# Patient Record
Sex: FEMALE | Race: WHITE | Employment: FULL TIME | URBAN - NONMETROPOLITAN AREA
[De-identification: names, ages, dates, MRNs, and addresses within clinical notes are randomized per-mention and may not be internally consistent; named-entity substitution may affect disease eponyms.]

---

## 2018-03-10 ENCOUNTER — HOSPITAL ENCOUNTER (EMERGENCY)
Age: 53
Discharge: HOME | End: 2018-03-10
Attending: NURSE PRACTITIONER
Payer: COMMERCIAL

## 2018-03-10 VITALS
OXYGEN SATURATION: 95 % | DIASTOLIC BLOOD PRESSURE: 77 MMHG | RESPIRATION RATE: 18 BRPM | TEMPERATURE: 98.2 F | HEART RATE: 85 BPM | SYSTOLIC BLOOD PRESSURE: 118 MMHG

## 2018-03-10 DIAGNOSIS — J98.01 BRONCHOSPASM, ACUTE: ICD-10-CM

## 2018-03-10 DIAGNOSIS — J06.9 UPPER RESPIRATORY INFECTION WITH COUGH AND CONGESTION: Primary | ICD-10-CM

## 2018-03-10 PROCEDURE — 99202 OFFICE O/P NEW SF 15 MIN: CPT | Performed by: NURSE PRACTITIONER

## 2018-03-10 PROCEDURE — 99212 OFFICE O/P EST SF 10 MIN: CPT

## 2018-03-10 RX ORDER — PREDNISONE 10 MG/1
10 TABLET ORAL 2 TIMES DAILY
Qty: 14 TABLET | Refills: 0 | Status: SHIPPED | OUTPATIENT
Start: 2018-03-10 | End: 2018-03-17

## 2018-03-10 RX ORDER — LEVOFLOXACIN 500 MG/1
500 TABLET, FILM COATED ORAL DAILY
Qty: 10 TABLET | Refills: 0 | Status: SHIPPED | OUTPATIENT
Start: 2018-03-10 | End: 2018-03-20

## 2018-03-10 RX ORDER — ALBUTEROL SULFATE 90 UG/1
2 AEROSOL, METERED RESPIRATORY (INHALATION) EVERY 4 HOURS PRN
Qty: 1 INHALER | Refills: 0 | Status: SHIPPED | OUTPATIENT
Start: 2018-03-10 | End: 2021-11-03

## 2018-03-10 RX ORDER — BENZONATATE 100 MG/1
100 CAPSULE ORAL 3 TIMES DAILY PRN
Qty: 21 CAPSULE | Refills: 0 | Status: SHIPPED | OUTPATIENT
Start: 2018-03-10 | End: 2018-03-17

## 2018-03-10 ASSESSMENT — ENCOUNTER SYMPTOMS
NAUSEA: 0
SINUS PRESSURE: 1
COUGH: 1
VOMITING: 0
WHEEZING: 1
SINUS PAIN: 1
DIARRHEA: 0
SORE THROAT: 1
CONSTIPATION: 0
SHORTNESS OF BREATH: 0
CHEST TIGHTNESS: 1

## 2018-03-10 ASSESSMENT — PAIN SCALES - GENERAL: PAINLEVEL_OUTOF10: 2

## 2018-03-10 ASSESSMENT — PAIN DESCRIPTION - DESCRIPTORS: DESCRIPTORS: SORE

## 2018-03-10 ASSESSMENT — PAIN DESCRIPTION - PAIN TYPE: TYPE: ACUTE PAIN

## 2018-03-10 ASSESSMENT — PAIN DESCRIPTION - LOCATION: LOCATION: THROAT

## 2018-03-10 NOTE — ED NOTES
No change in patients condition. Discharge instructions and prescriptions discussed with pt. Pt. Verbalized understanding of info given and ambulated to exit in stable condition.       Lenora Romero RN  03/10/18 7480

## 2018-03-10 NOTE — ED PROVIDER NOTES
bee venom and amoxicillin. FAMILY HISTORY     Patient's family history includes Early Death in her father; Heart Disease in her father; Other in her brother and mother. SOCIAL HISTORY     Patient  reports that she has never smoked. She has never used smokeless tobacco. She reports that she drinks alcohol. She reports that she does not use drugs. PHYSICAL EXAM     ED TRIAGE VITALS  BP: 118/77, Temp: 98.2 °F (36.8 °C), Pulse: 85, Resp: 18, SpO2: 95 %  Physical Exam   Constitutional: She is oriented to person, place, and time. She appears well-developed and well-nourished. No distress. HENT:   Head: Normocephalic and atraumatic. Right Ear: External ear normal. A middle ear effusion is present. Left Ear: External ear normal. A middle ear effusion is present. Nose: Mucosal edema (erythema) present. Right sinus exhibits maxillary sinus tenderness. Left sinus exhibits maxillary sinus tenderness. Mouth/Throat: Uvula is midline and mucous membranes are normal. Oropharyngeal exudate, posterior oropharyngeal edema (injected) and posterior oropharyngeal erythema (minimal diffuse) present. Eyes: Conjunctivae are normal.   Neck: Neck supple. Cardiovascular: Normal rate, regular rhythm and normal heart sounds. Exam reveals no gallop and no friction rub. No murmur heard. Pulmonary/Chest: Effort normal. No respiratory distress. She has decreased breath sounds in the left middle field and the left lower field. She has wheezes in the left middle field and the left lower field. She has no rhonchi. She has no rales. Lymphadenopathy:     She has cervical adenopathy. Neurological: She is alert and oriented to person, place, and time. Skin: Skin is warm and dry. Psychiatric: She has a normal mood and affect. Her behavior is normal.   Nursing note and vitals reviewed. DIAGNOSTIC RESULTS   Labs:No results found for this visit on 03/10/18.     IMAGING:    URGENT CARE COURSE:     Vitals:    03/10/18 1235

## 2018-03-22 ENCOUNTER — HOSPITAL ENCOUNTER (OUTPATIENT)
Age: 53
Discharge: HOME | End: 2018-03-22
Payer: COMMERCIAL

## 2018-03-22 ENCOUNTER — HOSPITAL ENCOUNTER (OUTPATIENT)
Dept: GENERAL RADIOLOGY | Age: 53
Discharge: HOME | End: 2018-03-22
Payer: COMMERCIAL

## 2018-03-22 DIAGNOSIS — M54.2 CERVICAL PAIN: ICD-10-CM

## 2018-03-22 PROCEDURE — 72050 X-RAY EXAM NECK SPINE 4/5VWS: CPT

## 2019-04-09 ENCOUNTER — EMPLOYEE WELLNESS (OUTPATIENT)
Dept: OTHER | Age: 54
End: 2019-04-09

## 2019-04-09 LAB
CHOLESTEROL, TOTAL: 167 MG/DL (ref 0–199)
FASTING: YES
GLUCOSE BLD-MCNC: 92 MG/DL (ref 74–109)
HDLC SERPL-MCNC: 51 MG/DL (ref 40–90)
LDL CHOLESTEROL CALCULATED: 102 MG/DL
TRIGL SERPL-MCNC: 70 MG/DL (ref 0–199)

## 2019-04-15 VITALS — BODY MASS INDEX: 26.5 KG/M2 | WEIGHT: 190 LBS

## 2021-11-03 ENCOUNTER — OFFICE VISIT (OUTPATIENT)
Dept: ENT CLINIC | Age: 56
End: 2021-11-03
Payer: COMMERCIAL

## 2021-11-03 VITALS
RESPIRATION RATE: 14 BRPM | OXYGEN SATURATION: 98 % | SYSTOLIC BLOOD PRESSURE: 116 MMHG | TEMPERATURE: 97.3 F | DIASTOLIC BLOOD PRESSURE: 82 MMHG | HEART RATE: 86 BPM | HEIGHT: 71 IN | BODY MASS INDEX: 24.5 KG/M2 | WEIGHT: 175 LBS

## 2021-11-03 DIAGNOSIS — R05.3 CHRONIC COUGH: ICD-10-CM

## 2021-11-03 DIAGNOSIS — K01.1 IMPACTED NON-SUPERNUMERARY TOOTH: Primary | ICD-10-CM

## 2021-11-03 DIAGNOSIS — R09.82 PND (POST-NASAL DRIP): ICD-10-CM

## 2021-11-03 DIAGNOSIS — Z98.1 HISTORY OF FUSION OF CERVICAL SPINE: ICD-10-CM

## 2021-11-03 DIAGNOSIS — R49.9 CHANGE IN VOICE: ICD-10-CM

## 2021-11-03 PROCEDURE — 99203 OFFICE O/P NEW LOW 30 MIN: CPT | Performed by: PHYSICIAN ASSISTANT

## 2021-11-03 RX ORDER — FLUTICASONE PROPIONATE 50 MCG
1 SPRAY, SUSPENSION (ML) NASAL DAILY
Qty: 16 G | Refills: 0 | Status: SHIPPED | OUTPATIENT
Start: 2021-11-03 | End: 2022-01-24 | Stop reason: ALTCHOICE

## 2021-11-03 RX ORDER — CEFDINIR 300 MG/1
CAPSULE ORAL
COMMUNITY
Start: 2021-10-30 | End: 2022-01-24 | Stop reason: ALTCHOICE

## 2021-11-03 NOTE — PATIENT INSTRUCTIONS
nasal saline over the counter at pharmacy. [place] a couple sprays in each nostril 5-10 minutes before using your Flonase. The Flonase was prescribed for 1 spray to each nostril daily. You can increase to 1 spray twice daily if you have not had any improvement in 2 weeks of routine use.

## 2021-11-03 NOTE — PROGRESS NOTES
[address], NOSE AND THROAT  [facility]  Dept: [phone]  Dept Fax: [phone]  Loc: [phone]    Lizet Jones is a 60 y.o. female who was referred by [facility] JAYLEN Shabazz for:  Chief Complaint   Patient presents with    New Patient     Pt is here for maxillary pain and pressure   . HPI:     Patient presents for evaluation of sinus/dental problem. The patient saw her oral surgeon a couple of months ago and she was told that her right upper wisdom tooth is growing back. The oral surgeon reportedly completed a panorex and told the patient that the tooth is in the maxillary sinus and that there is some thickening and something encapsulated in the sinus that may be causing blockage. He recommended ENT evaluation before proceeding with extraction. The patient states that she had all wisdom teeth removed when she was 16. She denies any dental pain, facial pain, purulent/malodorous nasal drainage. She denies a history of recurrent sinus infections. The patient also reports 6 months of chronic, dry cough. She states it starts as a tickle in her throat typically. She can get coughing spells where she dry heaves afterward. 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 She states that the cough does not seem to worsen after she works. She is currently on Omnicef for upper respiratory infection and states that is helping it clear up. She does report some degree of environmental allergies. 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 Typically gets symptoms of clear rhinorrhea and she occasionally takes OTC antihistamine as needed. She denies any history of acid reflux symptoms. She avoids snacking within a few hours of going to bed as well. She has 1 cup of coffee per day, but otherwise avoids caffeine.   She does consume citrus fruits fairly routinely. She also reports minimal intake of chocolate, carbonated drinks, and alcohol. She denies any hemoptysis, dysphagia, and unintentional weight loss. She does also experience fairly frequent postnasal drainage reportedly. She states that she does feel like she has to strain to speak, but this has been ongoing since she had her anterior cervical fusion a few years ago. She denies any other symptoms or concerns at this time. Subjective:      REVIEW OF SYSTEMS:    A complete multi-organ review of systems was performed using a new patient questionnaire, and reviewed by me. ENT:  negative except as noted in HPI  CONSTITUTIONAL:  negative except as noted in HPI  EYES:  negative except as noted in HPI  RESPIRATORY:  negative except as noted in HPI  CARDIOVASCULAR:  negative except as noted in HPI  GASTROINTESTINAL:  negative except as noted in HPI  GENITOURINARY:  negative except as noted in HPI  MUSCULOSKELETAL:  negative except as noted in HPI  SKIN:  negative except as noted in HPI  ENDOCRINE/METABOLIC: negative except as noted in HPI  HEMATOLOGIC/LYMPHATIC:  negative except as noted in HPI  ALLERGY/IMMUN: negative except as noted in HPI  NEUROLOGICAL:  negative except as noted in HPI  BEHAVIOR/PSYCH:  negative except as noted in HPI    Past Medical History:  Past Medical History:   Diagnosis Date    Frequent PVCs        Social History:    TOBACCO:   reports that she has never smoked. She has never used smokeless tobacco.  ETOH:   reports current alcohol use. DRUGS:   reports no history of drug use. Family History:       Problem Relation Age of Onset    Heart Disease Father     Early Death Father     Other Mother         sudden death   [address] [facility] [address] Other Brother         WPW, Cardiomyopathy-ICD       Surgical History:  Past Surgical History:   Procedure Laterality Date    OTHER SURGICAL HISTORY  26 Nov 2013    C5-7 ACDF (Dr. Joseph Melgar, [facility])    TUBAL LIGATION          Objective:      This is a 60 y.o. female. Patient is alert and oriented to person, place and time. Patient appears well developed, well nourished. Mood is happy with normal affect. Not obviously hearing impaired. The patient is not obviously hoarse on exam at this time. /82 (Site: Right Upper Arm, Position: Sitting)   Pulse 86   Temp 97.3 °F (36.3 °C) (Infrared)   Resp 14   Ht 5' 11\" (1.803 m)   Wt 175 lb (79.4 kg)   SpO2 98%   BMI 24.41 kg/m²     Head:   Normocephalic, atraumatic. No obvious masses or lesions noted. Ears:  External ears: Normal: no scars, lesions or masses. Mastoid process: No erythema noted. No tenderness to palpation. R External auditory canal: clear and free of any pathology  L External auditory canal: clear and free of any pathology   Tympanic membranes:  R intact, translucent                                                  L intact, translucent     Nose:    External nose: Appears midline. No obvious deformity or masses. Septum:  deviated. No septal hematoma. No perforation. Mucosa:  clear  Turbinates: normal and pink            Discharge:  clear    Mouth/Throat:  Lips, tongue and oral cavity: Normal. No masses or lesions noted   Dentition: fair. There is a cavity where the right upper wisdom tooth is starting to erupt from. Nontender to palpation, no evidence of infection  Oral mucosa: moist  Tonsils: present, not acutely enlarged and no erythema or exudates  Oropharynx: normal-appearing mucosa  Hard and soft palates: symmetrical and intact. Salivary glands: not enlarged and no tenderness to palpation. Uvula: midline, no obvious lesions   Gag reflex is present. Neck: Trachea midline. Thyroid not enlarged, no palpable masses or tenderness. Lymphatic: No cervical lymphadenopathy noted. Eyes: VALERIA, EOM intact. Conjunctiva moist without discharge. Lungs: Normal effort of breathing, not obviously distressed. Neuro: Cranial nerves II-XII grossly intact.   Extremities: No clubbing, edema, or cyanosis noted. Assessment/Plan:     Diagnosis Orders   1. Impacted non-supernumerary tooth  CT FACIAL BONES WO CONTRAST   2. PND (post-nasal drip)  CT FACIAL BONES WO CONTRAST    fluticasone (FLONASE) 50 MCG/ACT nasal spray   3. Chronic cough  fluticasone (FLONASE) 50 MCG/ACT nasal spray   4. Change in voice     5. History of fusion of cervical spine         The patient is a 60 y.o. female that presents for evaluation of sinus problem and cough complaints. Patient was seen and plan created in conjunction with Dr. Gabino Bosch. We recommended proceeding with a CT facial bones to further assess the tooth and the reported abnormal findings on the Panorex. We explained to the patient that removal of the tooth will likely create an oroantral fistula, but these can be closed surgically if she were to develop one. However, we will proceed with a CT to further assess for other concerning findings. We also recommended a trial of Flonase to see if this will improve her postnasal drainage and subsequently improve the chronic cough. Patient will return in a few weeks for the results of the CT facial bone. At that time, we will discuss her response to [facility] and likely proceed with fiberoptic laryngoscopy to further assess sources of her cough and her increased work of speech. Patient expresses understanding of the plan and agree. She will contact the office in the meantime with new/worsening symptoms or other concerns.     (Please note that portions of this note may have been completed with a voice recognition program.  Efforts were made to edit the dictation but occasionally words are mis-transcribed.)    Electronically signed by ANT Dunn on 11/3/2021 at 2:47 PM

## 2021-11-05 ENCOUNTER — APPOINTMENT (OUTPATIENT)
Dept: CT IMAGING | Age: 56
End: 2021-11-05
Payer: COMMERCIAL

## 2021-11-05 ENCOUNTER — HOSPITAL ENCOUNTER (EMERGENCY)
Age: 56
Discharge: HOME | End: 2021-11-05
Attending: EMERGENCY MEDICINE
Payer: COMMERCIAL

## 2021-11-05 VITALS
HEART RATE: 103 BPM | TEMPERATURE: 98.7 F | SYSTOLIC BLOOD PRESSURE: 119 MMHG | OXYGEN SATURATION: 97 % | RESPIRATION RATE: 16 BRPM | DIASTOLIC BLOOD PRESSURE: 78 MMHG

## 2021-11-05 DIAGNOSIS — S39.012A STRAIN OF LUMBAR REGION, INITIAL ENCOUNTER: Primary | ICD-10-CM

## 2021-11-05 LAB
ANION GAP SERPL CALCULATED.3IONS-SCNC: 17 MEQ/L (ref 8–16)
BASOPHILS # BLD: 0.2 %
BASOPHILS ABSOLUTE: 0 THOU/MM3 (ref 0–0.1)
BILIRUBIN URINE: NEGATIVE
BLOOD, URINE: NEGATIVE
BUN BLDV-MCNC: 11 MG/DL (ref 7–22)
C-REACTIVE PROTEIN: 1.73 MG/DL (ref 0–1)
CALCIUM SERPL-MCNC: 9.2 MG/DL (ref 8.5–10.5)
CHARACTER, URINE: CLEAR
CHLORIDE BLD-SCNC: 100 MEQ/L (ref 98–111)
CO2: 20 MEQ/L (ref 23–33)
COLOR: YELLOW
CREAT SERPL-MCNC: 0.5 MG/DL (ref 0.4–1.2)
EOSINOPHIL # BLD: 0.1 %
EOSINOPHILS ABSOLUTE: 0 THOU/MM3 (ref 0–0.4)
ERYTHROCYTE [DISTWIDTH] IN BLOOD BY AUTOMATED COUNT: 13 % (ref 11.5–14.5)
ERYTHROCYTE [DISTWIDTH] IN BLOOD BY AUTOMATED COUNT: 45.1 FL (ref 35–45)
GFR SERPL CREATININE-BSD FRML MDRD: > 90 ML/MIN/1.73M2
GLUCOSE BLD-MCNC: 97 MG/DL (ref 70–108)
GLUCOSE URINE: NEGATIVE MG/DL
HCT VFR BLD CALC: 40.7 % (ref 37–47)
HEMOGLOBIN: 13.3 GM/DL (ref 12–16)
IMMATURE GRANS (ABS): 0.05 THOU/MM3 (ref 0–0.07)
IMMATURE GRANULOCYTES: 0.4 %
KETONES, URINE: >= 160
LEUKOCYTE ESTERASE, URINE: NEGATIVE
LYMPHOCYTES # BLD: 5.6 %
LYMPHOCYTES ABSOLUTE: 0.7 THOU/MM3 (ref 1–4.8)
MCH RBC QN AUTO: 30.9 PG (ref 26–33)
MCHC RBC AUTO-ENTMCNC: 32.7 GM/DL (ref 32.2–35.5)
MCV RBC AUTO: 94.4 FL (ref 81–99)
MONOCYTES # BLD: 5 %
MONOCYTES ABSOLUTE: 0.6 THOU/MM3 (ref 0.4–1.3)
NITRITE, URINE: NEGATIVE
NUCLEATED RED BLOOD CELLS: 0 /100 WBC
PH UA: 5 (ref 5–9)
PLATELET # BLD: 274 THOU/MM3 (ref 130–400)
PMV BLD AUTO: 10.8 FL (ref 9.4–12.4)
POTASSIUM REFLEX MAGNESIUM: 3.8 MEQ/L (ref 3.5–5.2)
PROTEIN UA: NEGATIVE
RBC # BLD: 4.31 MILL/MM3 (ref 4.2–5.4)
SEDIMENTATION RATE, ERYTHROCYTE: 16 MM/HR (ref 0–20)
SEG NEUTROPHILS: 88.7 %
SEGMENTED NEUTROPHILS ABSOLUTE COUNT: 11.1 THOU/MM3 (ref 1.8–7.7)
SODIUM BLD-SCNC: 137 MEQ/L (ref 135–145)
SPECIFIC GRAVITY, URINE: 1.01 (ref 1–1.03)
UROBILINOGEN, URINE: 0.2 EU/DL (ref 0–1)
WBC # BLD: 12.5 THOU/MM3 (ref 4.8–10.8)

## 2021-11-05 PROCEDURE — 2580000003 HC RX 258

## 2021-11-05 PROCEDURE — 96361 HYDRATE IV INFUSION ADD-ON: CPT

## 2021-11-05 PROCEDURE — 76376 3D RENDER W/INTRP POSTPROCES: CPT

## 2021-11-05 PROCEDURE — 81003 URINALYSIS AUTO W/O SCOPE: CPT

## 2021-11-05 PROCEDURE — 86140 C-REACTIVE PROTEIN: CPT

## 2021-11-05 PROCEDURE — 85025 COMPLETE CBC W/AUTO DIFF WBC: CPT

## 2021-11-05 PROCEDURE — 80048 BASIC METABOLIC PNL TOTAL CA: CPT

## 2021-11-05 PROCEDURE — 36415 COLL VENOUS BLD VENIPUNCTURE: CPT

## 2021-11-05 PROCEDURE — 99282 EMERGENCY DEPT VISIT SF MDM: CPT

## 2021-11-05 PROCEDURE — 96374 THER/PROPH/DIAG INJ IV PUSH: CPT

## 2021-11-05 PROCEDURE — 74176 CT ABD & PELVIS W/O CONTRAST: CPT

## 2021-11-05 PROCEDURE — 85651 RBC SED RATE NONAUTOMATED: CPT

## 2021-11-05 PROCEDURE — 6360000002 HC RX W HCPCS

## 2021-11-05 RX ORDER — SODIUM CHLORIDE, SODIUM LACTATE, POTASSIUM CHLORIDE, AND CALCIUM CHLORIDE .6; .31; .03; .02 G/100ML; G/100ML; G/100ML; G/100ML
1000 INJECTION, SOLUTION INTRAVENOUS ONCE
Status: COMPLETED | OUTPATIENT
Start: 2021-11-05 | End: 2021-11-05

## 2021-11-05 RX ORDER — KETOROLAC TROMETHAMINE 30 MG/ML
15 INJECTION, SOLUTION INTRAMUSCULAR; INTRAVENOUS EVERY 6 HOURS PRN
Status: DISCONTINUED | OUTPATIENT
Start: 2021-11-05 | End: 2021-11-05 | Stop reason: HOSPADM

## 2021-11-05 RX ORDER — KETOROLAC TROMETHAMINE 30 MG/ML
30 INJECTION, SOLUTION INTRAMUSCULAR; INTRAVENOUS EVERY 6 HOURS PRN
Status: DISCONTINUED | OUTPATIENT
Start: 2021-11-05 | End: 2021-11-05

## 2021-11-05 ASSESSMENT — PAIN SCALES - GENERAL
PAINLEVEL_OUTOF10: 6
PAINLEVEL_OUTOF10: 6

## 2021-11-05 NOTE — ED NOTES
Pt presents with severe low back pain radiating down both legs starting this morning. She reports that she took a oxycodone from  and this has helped with pain. She reports some recent constipation but now she is having incontinence of stool. Pt is alert and oriented.       Desiree Quan RN  11/05/21 0520

## 2021-11-05 NOTE — ED PROVIDER NOTES
I personally saw and examined the patient. I have reviewed and agreed with the resident physician's findings including all diagnostic interpretations and treatment plans as written. Please see resident physician's chart for detailed history of present illness, physical exam and medical decision making. I was present for the key portions of any procedures performed and the inclusive time noted in any critical care statement. This is a 64-year-old female with past medical history of frequent PVC presents to ED complaining of acute lower back pain since this morning. New pain. She denies back injury. Pain was sudden onset, shooting down to both feet. Pain is at moderate intensity. Patient had some chills last night but no fever. Physical exam: Afebrile, vital signs stable, cardiopulmonary exam unremarkable, abdomen soft. Tenderness to L4-L5 midline, intact bilateral lower extremity neurovascular exam.    Differential diagnosis: Low back strain, osteopenia, stress fracture, ruling out catastrophic spine pathology such as discitis or osteomyelitis, UTI, nephrolithiasis, lumbar radiculopathy, acute lumbar disc herniation, spinal stenosis    Plan: We will obtain labs and CT abdomen pelvis with lumbar reconstructive, IV Toradol for pain control. Pain is better on reassessment. Lab results are reassuring. CT abdomen pelvis with lumbar recon does not show acute abnormality. Clinically this is musculoskeletal strain. Patient's UA shows ketones moderate 160, patient is taking keto diet. Patient discharged with PCP follow-up. OTC Tylenol or ibuprofen as needed for pain. Medications   ketorolac (TORADOL) injection 15 mg (15 mg IntraVENous Given 11/5/21 1500)   lactated ringers bolus (0 mLs IntraVENous Stopped 11/5/21 1631)     DIAGNOSIS  1.  Strain of lumbar region, initial encounter        DISPOSITION and John Abel M.D.         Demi Alves MD  11/05/21 9973

## 2021-11-05 NOTE — ED PROVIDER NOTES
[address] [address]          Pt Name: Main Cast  MRN: [id number]  [place] 1965  Date of evaluation: 11/5/2021  Treating Resident Physician: Isis Soto MD  Supervising Physician: Dr. Lorie Grider MD    CHIEF COMPLAINT     No chief complaint on file. History obtained from the patient. HISTORY OF PRESENT ILLNESS    HPI  Main Cast is a 60 y.o. female who presents to the emergency department for evaluation of new onset lower back pain starting this morning. Patient reports that her pain radiates over the lateral aspect of both of her legs all the way down to her feet. She has a history of chronic left-sided neck pain and left arm pain and only takes Flonase and multivitamins. Patient denies any inciting trauma or previous injury to this site. She locates her pain over the L4-L5 area directly midline. Patient states during onset of pain, she was sitting down on a chair and could not get comfortable. Patient reports that she is chronically constipated and states mild fecal incontinence this morning however subsequently retracted her statement. Denies associated numbness or tingling down her lower extremities. Does endorse mild chills last night but no fever. Denies fevers, nausea, headaches, dizziness, chest pain, abdominal pain, dysuria, hematuria, flank pain, suprapubic tenderness, melena, hematochezia, dyspnea, or shortness of breath. The patient has no other acute complaints at this time. REVIEW OF SYSTEMS   Review of Systems -negative except for the aforementioned.       PAST MEDICAL AND SURGICAL HISTORY     Past Medical History:   Diagnosis Date    Frequent PVCs      Past Surgical History:   Procedure Laterality Date    OTHER SURGICAL HISTORY  26 Nov 2013    C5-7 ACDF (Dr. Tiffanie Beatty, [facility])    TUBAL LIGATION           MEDICATIONS     Current Facility-Administered Medications:     ketorolac (TORADOL) injection 15 mg, 15 mg, IntraVENous, Q6H PRN, Tip Amin MD, 15 mg at 11/05/21 1500    lactated ringers bolus, 1,000 mL, IntraVENous, Once, Tip Amin MD    Current Outpatient Medications:     cefdinir (OMNICEF) 300 MG capsule, take 1 capsule by mouth twice a day for 10 days, Disp: , Rfl:     fluticasone (FLONASE) 50 MCG/ACT nasal spray, 1 spray by Each Nostril route daily, Disp: 16 g, Rfl: 0    Multiple Vitamins-Minerals (THERAPEUTIC MULTIVITAMIN-MINERALS) tablet, Take 1 tablet by mouth daily. , Disp: , Rfl:       SOCIAL HISTORY     Social History     Social History Narrative    Not on file     Social History     Tobacco Use    Smoking status: Never Smoker    Smokeless tobacco: Never Used   Substance Use Topics    Alcohol use: Yes     Comment: occas    Drug use: No         ALLERGIES     Allergies   Allergen Reactions    Bee Venom Swelling    Amoxicillin Swelling         FAMILY HISTORY     Family History   Problem Relation Age of Onset    Heart Disease Father     Early Death Father     Other Mother         sudden death   Bev Chinchilla Other Brother         WPW, Cardiomyopathy-ICD         PREVIOUS RECORDS   Previous records reviewed: Medications, labs, imaging, and notes. PHYSICAL EXAM     ED Triage Vitals   BP Temp Temp Source Pulse Resp SpO2 Height Weight   11/05/21 1249 11/05/21 1249 11/05/21 1249 11/05/21 1248 11/05/21 1248 11/05/21 1248 -- --   119/78 98.7 °F (37.1 °C) Oral 103 16 97 %       Initial vital signs and nursing assessment reviewed and Stable. There is no height or weight on file to calculate BMI. Pulsoximetry is 97% on room air with adequate waveform per my interpretation. Additional Vital Signs:  Vitals:    11/05/21 1249   BP: 119/78   Pulse:    Resp:    Temp: 98.7 °F (37.1 °C)   SpO2:        Physical Exam  Vitals and nursing note reviewed. Constitutional:       General: She is awake. Appearance: Normal appearance. She is not ill-appearing or diaphoretic.    HENT:      Head: Normocephalic and atraumatic. Right Ear: External ear normal.      Left Ear: External ear normal.      Nose: Nose normal.      Mouth/Throat:      Mouth: Mucous membranes are moist.      Pharynx: Oropharynx is clear. No oropharyngeal exudate or posterior oropharyngeal erythema. Eyes:      General: Lids are normal. No scleral icterus. Extraocular Movements: Extraocular movements intact. Pupils: Pupils are equal, round, and reactive to light. Cardiovascular:      Rate and Rhythm: Normal rate and regular rhythm. Pulses: Normal pulses. Radial pulses are 2+ on the right side and 2+ on the left side. Posterior tibial pulses are 2+ on the right side and 2+ on the left side. Heart sounds: Normal heart sounds. No murmur heard. No friction rub. No gallop. Pulmonary:      Effort: Pulmonary effort is normal.      Breath sounds: Normal breath sounds. No wheezing, rhonchi or rales. Abdominal:      General: Bowel sounds are normal.      Palpations: Abdomen is soft. Tenderness: There is no abdominal tenderness. There is no guarding or rebound. Musculoskeletal:      Cervical back: Normal range of motion and neck supple. Lumbar back: Tenderness (L4-L5) present. No swelling, deformity or lacerations. Right lower leg: No edema. Left lower leg: No edema. Skin:     General: Skin is warm and dry. Neurological:      General: No focal deficit present. Mental Status: She is alert and oriented to person, place, and time. Mental status is at baseline. GCS: GCS eye subscore is 4. GCS verbal subscore is 5. GCS motor subscore is 6. Psychiatric:         Attention and Perception: Attention and perception normal.         Mood and Affect: Mood and affect normal.         Speech: Speech normal.         Behavior: Behavior normal. Behavior is cooperative. Thought Content:  Thought content normal.         Cognition and Memory: Cognition and memory normal.         Judgment: Judgment normal.         MEDICAL DECISION MAKING   Initial Assessment:   Lower back pain -given subjective chills, and sudden onset back pain, cannot rule out infectious etiologies such as osteomyelitis. Given age, gender, and reproducible tenderness on palpation of her mid lumbar L4-L5 spine, cannot exclude acute lumbar compression fracture. Also on the differential is bilateral IT band syndrome, sciatica, nephrolithiasis, spinal stenosis, discitis, and UTI. Plan:    CT abdomen pelvis without contrast and with lumbar reconstruction   CBC   BMP   ESR   CRP   UA with microscopy and culture   Toradol for pain. ED RESULTS   Laboratory results:  Labs Reviewed   CBC WITH AUTO DIFFERENTIAL - Abnormal; Notable for the following components:       Result Value    WBC 12.5 (*)     RDW-SD 45.1 (*)     Segs Absolute 11.1 (*)     Lymphocytes Absolute 0.7 (*)     All other components within normal limits   BASIC METABOLIC PANEL W/ REFLEX TO MG FOR LOW K - Abnormal; Notable for the following components:    CO2 20 (*)     All other components within normal limits   C-REACTIVE PROTEIN - Abnormal; Notable for the following components:    CRP 1.73 (*)     All other components within normal limits   URINE RT REFLEX TO CULTURE - Abnormal; Notable for the following components:    Ketones, Urine >= 160 (*)     All other components within normal limits   ANION GAP - Abnormal; Notable for the following components:    Anion Gap 17.0 (*)     All other components within normal limits   GLOMERULAR FILTRATION RATE, ESTIMATED   SEDIMENTATION RATE       Radiologic studies results:  CT ABDOMEN PELVIS WO CONTRAST Additional Contrast? None   Final Result       1. Possible bilateral renal pelves. No evidence of renal stones. .   2. Small hiatal hernia. 3. Atherosclerotic calcification in the abdominal aorta and iliac arteries. 4. Degenerative changes involving the lumbar spine and sacroiliac joints bilaterally.    5. Tiny calcified granuloma at the left lung base. 6. Left breast implant. **This report has been created using voice recognition software. It may contain minor errors which are inherent in voice recognition technology. **      Final report electronically signed by DR Laura Amador on 11/5/2021 2:59 PM      CT LUMBAR RECONSTRUCTION WO POST PROCESS   Final Result    Mild discovertebral degenerative changes of the lumbar spine. **This report has been created using voice recognition software. It may contain minor errors which are inherent in voice recognition technology. **      Final report electronically signed by Dr. Arsenio Babcock MD on 11/5/2021 2:57 PM          ED Medications administered this visit:   Medications   ketorolac (TORADOL) injection 15 mg (15 mg IntraVENous Given 11/5/21 1500)   lactated ringers bolus (has no administration in time range)         ED COURSE     ED Course as of Nov 05 1518   Fri Nov 05, 2021   1454 CT ABDOMEN PELVIS WO CONTRAST Additional Contrast? None [KENRICK]   1229 CTAP + lumbar reconstruction without concern for infectious etiology. Mild degenerative changes throughout vertebra. CBC, BMP, CRP, and UA wnl. At this time, pt likely has muscle strain. Recommend rest, avoiding heavy lifting, and heating pad. [KENRICK]      ED Course User Index  [KENRICK] Leopoldo Gates MD     Strict return precautions and follow up instructions were discussed with the patient prior to discharge, with which the patient agrees. MEDICATION CHANGES     New Prescriptions    No medications on file         FINAL DISPOSITION     Final diagnoses:   Strain of lumbar region, initial encounter     Condition: condition: fair  Dispo: Discharge to home    This transcription was electronically signed. Parts of this transcriptions may have been dictated by use of voice recognition software and electronically transcribed, and parts may have been transcribed with the assistance of an ED scribe.  The transcription may contain errors not detected in proofreading. Please refer to my supervising physician's documentation if my documentation differs.     Electronically Signed: Nicolas Hutson MD, 11/05/21, 3:18 PM         Perla Castillo MD  Resident  11/05/21 9450

## 2021-12-17 ENCOUNTER — HOSPITAL ENCOUNTER (OUTPATIENT)
Dept: CT IMAGING | Age: 56
Discharge: HOME | End: 2021-12-17
Payer: COMMERCIAL

## 2021-12-17 DIAGNOSIS — R09.82 PND (POST-NASAL DRIP): ICD-10-CM

## 2021-12-17 DIAGNOSIS — K01.1 IMPACTED NON-SUPERNUMERARY TOOTH: ICD-10-CM

## 2021-12-17 PROCEDURE — 70486 CT MAXILLOFACIAL W/O DYE: CPT

## 2022-01-24 ENCOUNTER — OFFICE VISIT (OUTPATIENT)
Dept: ENT CLINIC | Age: 57
End: 2022-01-24
Payer: COMMERCIAL

## 2022-01-24 VITALS
DIASTOLIC BLOOD PRESSURE: 82 MMHG | OXYGEN SATURATION: 99 % | RESPIRATION RATE: 14 BRPM | HEIGHT: 71 IN | WEIGHT: 179.9 LBS | TEMPERATURE: 97.3 F | SYSTOLIC BLOOD PRESSURE: 132 MMHG | HEART RATE: 79 BPM | BODY MASS INDEX: 25.19 KG/M2

## 2022-01-24 DIAGNOSIS — Z86.19 HISTORY OF CLOSTRIDIOIDES DIFFICILE COLITIS: ICD-10-CM

## 2022-01-24 DIAGNOSIS — J32.1 CHRONIC FRONTAL SINUSITIS: ICD-10-CM

## 2022-01-24 DIAGNOSIS — J32.2 CHRONIC ETHMOIDAL SINUSITIS: ICD-10-CM

## 2022-01-24 DIAGNOSIS — J32.0 CHRONIC MAXILLARY SINUSITIS: Primary | ICD-10-CM

## 2022-01-24 DIAGNOSIS — J34.89 CONCHA BULLOSA: ICD-10-CM

## 2022-01-24 PROCEDURE — 99214 OFFICE O/P EST MOD 30 MIN: CPT | Performed by: NURSE PRACTITIONER

## 2022-01-24 RX ORDER — FLUTICASONE PROPIONATE 50 MCG
1 SPRAY, SUSPENSION (ML) NASAL 2 TIMES DAILY
Qty: 16 G | Refills: 2 | Status: SHIPPED | OUTPATIENT
Start: 2022-01-24 | End: 2022-10-13

## 2022-01-24 NOTE — PROGRESS NOTES
[facility] [place] [facility]  [facility]  [facility] [place]pt: [phone]  Dept Fax: [phone]  Loc: Heydi Mauricio is a 60 y.o. female who was referred by No ref. provider found for:  Chief Complaint   Patient presents with    Follow-up     Patient is here for a follow up CT scan      HPI:     Charlene Winslow is a 60 y.o. female here for follow up to review CT sinus findings. She has been on Flonase since prior to CT scan. At the time of her CT scan she was still symptomatic, but her cough and PND have since resolved. She denies any other sinonasal symptoms at this time. Initial HPI 11/3/2021:  Patient presents for evaluation of sinus/dental problem. The patient saw her oral surgeon a couple of months ago and she was told that her right upper wisdom tooth is growing back. The oral surgeon reportedly completed a panorex and told the patient that the tooth is in the maxillary sinus and that there is some thickening and something encapsulated in the sinus that may be causing blockage. He recommended ENT evaluation before proceeding with extraction. The patient states that she had all wisdom teeth removed when she was 16. She denies any dental pain, facial pain, purulent/malodorous nasal drainage. She denies a history of recurrent sinus infections.      The patient also reports 6 months of chronic, dry cough. She states it starts as a tickle in her throat typically. She can get coughing spells where she dry heaves afterward. 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 She states that the cough does not seem to worsen after she works. She is currently on Omnicef for upper respiratory infection and states that is helping it clear up. She does report some degree of environmental allergies. 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 Typically gets symptoms of clear rhinorrhea and she occasionally takes OTC antihistamine as needed. She denies any history of acid reflux symptoms. She avoids snacking within a few hours of going to bed as well. She has 1 cup of coffee per day, but otherwise avoids caffeine. She does consume citrus fruits fairly routinely. She also reports minimal intake of chocolate, carbonated drinks, and alcohol. She denies any hemoptysis, dysphagia, and unintentional weight loss. She does also experience fairly frequent postnasal drainage reportedly. She states that she does feel like she has to strain to speak, but this has been ongoing since she had her anterior cervical fusion a few years ago. She denies any other symptoms or concerns at this time. History: Allergies   Allergen Reactions    Bee Venom Swelling    Amoxicillin Swelling     Current Outpatient Medications   Medication Sig Dispense Refill    fluticasone (FLONASE) 50 MCG/ACT nasal spray 1 spray by Each Nostril route 2 times daily 16 g 2    Multiple Vitamins-Minerals (THERAPEUTIC MULTIVITAMIN-MINERALS) tablet Take 1 tablet by mouth daily. No current facility-administered medications for this visit. Past Medical History:   Diagnosis Date    Frequent PVCs       Past Surgical History:   Procedure Laterality Date    OTHER SURGICAL HISTORY  26 Nov 2013    C5-7 ACDF (Dr. Gely Zuniga, [facility])    TUBAL LIGATION       Family History   Problem Relation Age of Onset    Heart Disease Father     Early Death Father     Other Mother         sudden death   Wilhelminia Blazing Other Brother         WPW, Cardiomyopathy-ICD     Social History     Tobacco Use    Smoking status: Never Smoker    Smokeless tobacco: Never Used   Substance Use Topics    Alcohol use: Yes     Comment: occas        Subjective:      Review of Systems  Rest of review of systems are negative, except as noted in HPI.      Objective:     /82 (Site: Left Upper Arm, Position: Sitting)   Pulse 79 Temp 97.3 °F (36.3 °C) (Infrared)   Resp 14   Ht 5' 11\" (1.803 m)   Wt 179 lb 14.4 oz (81.6 kg)   SpO2 99%   BMI 25.09 kg/m²     PHYSICAL EXAM  Constitutional: Oriented to person, place, and time. Appears stated aged. Appears well-developed and well-nourished. Voice and speech pattern appropriate for age and gender. No distress. No stridor or audible wheezing. Vitals reviewed. Data:  All of the past medical history, past surgical history, family history,social history, allergies and current medications were reviewed with the patient. CT Facial Bones WO Contrast 12/17/2021  Narrative   PROCEDURE: CT FACIAL BONES WO CONTRAST       CLINICAL INFORMATION: Impacted non-supernumerary tooth, PND (post-nasal drip).       COMPARISON: No prior study.       TECHNIQUE: Helical CT of the sinuses with sagittal and coronal reconstructions.       All CT scans at this facility use dose modulation, iterative reconstruction, and/or weight-based dosing when appropriate to reduce radiation dose to as low as reasonably achievable.       FINDINGS:   Whit [place]ander is moderate mucosal thickening in the left maxillary sinus with occlusion of the ostiomeatal unit. There is an ectopic tooth with the crown embedded within the floor of the right maxillary sinus. The roots of the teeth extend into the antrum. There is mild mucosal thickening in the right maxillary sinus. The ostiomeatal unit is clear. There is mild mucosal thickening in the frontal sinuses with occlusion of the frontal ethmoidal recesses. There is moderate mucosal thickening in the ethmoid    air cells. Sphenoid sinuses and sphenoethmoidal recesses are clear. There is mild leftward deviation of the nasal septum.       Orbits are unremarkable. Paranasal sinuses and mastoid air cells are clear. Visualized intracranial contents are unremarkable. Temporal mandibular joints appear intact.       Impression       1. Ectopic tooth within the right maxillary sinus.    2. Mild to moderate sinusitis.       **This report has been created using voice recognition software. It may contain minor errors which are inherent in voice recognition technology. **       Final report electronically signed by Dr. Nancie Torres MD on 12/17/2021 12:01 PM                      Assessment & Plan   Diagnoses and all orders for this visit:     Diagnosis Orders   1. Chronic maxillary sinusitis  CT FACIAL BONES WO CONTRAST   2. Chronic frontal sinusitis  CT FACIAL BONES WO CONTRAST   3. Chronic ethmoidal sinusitis  CT FACIAL BONES WO CONTRAST   4. Dante bullosa (left middle turbinate)  CT FACIAL BONES WO CONTRAST   5. History of Clostridioides difficile colitis         The findings were explained and her questions were answered. CT imaging reviewed in detail with patient. Regarding her initial referral reason for impacted right maxillary tooth, I would highly advise against removal as long as she remains asymptomatic from that standpoint. The tooth takes up space along the entire right maxillary floor, so it's removal will likely require bone grafting otherwise she will have large oroantral fistula after removal.  Patient has mucosal thickening evident of chronic sinus disease in both ethmoid sinuses, both maxillary sinuses and at the inlet of both frontal sinuses. The left middle turbinate dante bullosa causing blockage of the left osteomeatal complex and likely is causing the right maxillary sinus not to drain properly. There is a similar process on the left side, although no dante bullosa present. The Flonase spray is keeping her symptoms at [place] [id number] for now, but due to these findings she will be highly susceptible to worsening sinus disease with recurrent infections. Typically would give course of broad-spectrum antibiotics, up to 4 weeks duration, but patient does not tolerate antibiotics from a GI standpoint secondary to more recent history of C-diff colitis.   Will treat with BID saline irrigations followed by Flonase spray. In about 2 months from now, we will repeat CT sinus to determine if topical therapy is enough to resolve her sinus disease. Patient verbalizes understanding of plan of care. Management of patient's care was collaborated with Dr Ludivina Moreno today. Return for results review. **This report has been created using voice recognition software. It may contain minor errors which are inherent in voice recognition technology. **

## 2022-03-07 ENCOUNTER — HOSPITAL ENCOUNTER (OUTPATIENT)
Dept: CT IMAGING | Age: 57
Discharge: HOME | End: 2022-03-07
Payer: COMMERCIAL

## 2022-03-07 ENCOUNTER — OFFICE VISIT (OUTPATIENT)
Dept: ENT CLINIC | Age: 57
End: 2022-03-07
Payer: COMMERCIAL

## 2022-03-07 ENCOUNTER — PREP FOR PROCEDURE (OUTPATIENT)
Dept: ENT CLINIC | Age: 57
End: 2022-03-07

## 2022-03-07 VITALS
HEIGHT: 71 IN | BODY MASS INDEX: 25.48 KG/M2 | WEIGHT: 182 LBS | TEMPERATURE: 98.1 F | RESPIRATION RATE: 14 BRPM | SYSTOLIC BLOOD PRESSURE: 110 MMHG | DIASTOLIC BLOOD PRESSURE: 70 MMHG | OXYGEN SATURATION: 94 % | HEART RATE: 85 BPM

## 2022-03-07 DIAGNOSIS — J34.89 CONCHA BULLOSA: ICD-10-CM

## 2022-03-07 DIAGNOSIS — J32.0 CHRONIC MAXILLARY SINUSITIS: Primary | ICD-10-CM

## 2022-03-07 DIAGNOSIS — J32.0 CHRONIC MAXILLARY SINUSITIS: ICD-10-CM

## 2022-03-07 DIAGNOSIS — R09.82 PND (POST-NASAL DRIP): ICD-10-CM

## 2022-03-07 DIAGNOSIS — J32.1 CHRONIC FRONTAL SINUSITIS: ICD-10-CM

## 2022-03-07 DIAGNOSIS — J32.2 CHRONIC ETHMOIDAL SINUSITIS: ICD-10-CM

## 2022-03-07 DIAGNOSIS — J34.3 NASAL TURBINATE HYPERTROPHY: ICD-10-CM

## 2022-03-07 DIAGNOSIS — K01.1 IMPACTED NON-SUPERNUMERARY TOOTH: ICD-10-CM

## 2022-03-07 DIAGNOSIS — Z98.1 HISTORY OF FUSION OF CERVICAL SPINE: ICD-10-CM

## 2022-03-07 DIAGNOSIS — R49.9 CHANGE IN VOICE: ICD-10-CM

## 2022-03-07 DIAGNOSIS — Z01.818 PRE-OP TESTING: Primary | ICD-10-CM

## 2022-03-07 DIAGNOSIS — R05.3 CHRONIC COUGH: ICD-10-CM

## 2022-03-07 PROCEDURE — 70486 CT MAXILLOFACIAL W/O DYE: CPT

## 2022-03-07 PROCEDURE — 99214 OFFICE O/P EST MOD 30 MIN: CPT | Performed by: PHYSICIAN ASSISTANT

## 2022-03-07 NOTE — PROGRESS NOTES
[facility] [place] [facility]  [facility]  [facility] [place]  Dept: [phone]  Dept Fax: [phone]  Loc: Leland Carter is a 60 y.o. female who was referred by No ref. provider found for:  Chief Complaint   Patient presents with    Follow-up     Patient is here for a follow up after CT scan    . HPI:     Current visit HPI- The patient presents for follow up to review CT results and further discussions of symptoms. She reports she does feel congested intermittently. She has frequent nasal drainage still that seems to cause her to cough. She states that her previous cough was dry, but recently has been more of a wet cough. She denies any chest pain, shortness of breath, fevers, chills. She reports some recent wheezing due to the drainage. She denies any other symptoms or concerns at this time. Previous ENT HPI 1/24/22- Deann Kang is a 60 y.o. female here for follow up to review CT sinus findings. She has been on Flonase since prior to CT scan. At the time of her CT scan she was still symptomatic, but her cough and PND have since resolved. She denies any other sinonasal symptoms at this time. Initial ENT HPI 11/3/21- Patient presents for evaluation of sinus/dental problem. The patient saw her oral surgeon a couple of months ago and she was told that her right upper wisdom tooth is growing back. The oral surgeon reportedly completed a panorex and told the patient that the tooth is in the maxillary sinus and that there is some thickening and something encapsulated in the sinus that may be causing blockage. He recommended ENT evaluation before proceeding with extraction. The patient states that she had all wisdom teeth removed when she was 16. She denies any dental pain, facial pain, purulent/malodorous nasal drainage.  She denies a history of recurrent sinus infections.      The patient also reports 6 months of chronic, dry cough. She states it starts as a tickle in her throat typically. She can get coughing spells where she dry heaves afterward. 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 She states that the cough does not seem to worsen after she works. She is currently on Omnicef for upper respiratory infection and states that is helping it clear up. She does report some degree of environmental allergies. 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 Typically gets symptoms of clear rhinorrhea and she occasionally takes OTC antihistamine as needed. She denies any history of acid reflux symptoms. She avoids snacking within a few hours of going to bed as well. She has 1 cup of coffee per day, but otherwise avoids caffeine. She does consume citrus fruits fairly routinely. She also reports minimal intake of chocolate, carbonated drinks, and alcohol. She denies any hemoptysis, dysphagia, and unintentional weight loss. She does also experience fairly frequent postnasal drainage reportedly. She states that she does feel like she has to strain to speak, but this has been ongoing since she had her anterior cervical fusion a few years ago. She denies any other symptoms or concerns at this time. Subjective:      REVIEW OF SYSTEMS:    A complete multi-organ review of systems was performed using a new patient questionnaire, and reviewed by me.   ENT:  negative except as noted in HPI  CONSTITUTIONAL:  negative except as noted in HPI  EYES:  negative except as noted in HPI  RESPIRATORY:  negative except as noted in HPI  CARDIOVASCULAR:  negative except as noted in HPI  GASTROINTESTINAL:  negative except as noted in HPI  GENITOURINARY:  negative except as noted in HPI  MUSCULOSKELETAL:  negative except as noted in HPI  SKIN:  negative except as noted in HPI  ENDOCRINE/METABOLIC: negative except as noted in HPI  HEMATOLOGIC/LYMPHATIC: negative except as noted in HPI  ALLERGY/IMMUN: negative except as noted in HPI  NEUROLOGICAL:  negative except as noted in HPI  BEHAVIOR/PSYCH:  negative except as noted in HPI    Past Medical History:  Past Medical History:   Diagnosis Date    Frequent PVCs      Social History:    TOBACCO:   reports that she has never smoked. She has never used smokeless tobacco.  ETOH:   reports current alcohol use. DRUGS:   reports no history of drug use. Family History:       Problem Relation Age of Onset    Heart Disease Father     Early Death Father     Other Mother         sudden death   [place] Other Brother         WPW, Cardiomyopathy-ICD       Surgical History:  Past Surgical History:   Procedure Laterality Date    OTHER SURGICAL HISTORY  26 Nov 2013    C5-7 ACDF (Dr. Pravin Grimaldo, [facility])    TUBAL LIGATION          Objective: This is a 60 y.o. female. Patient is alert and oriented to person, place and time. Patient appears well developed, well nourished. Mood is happy with normal affect. Not obviously hearing impaired. No abnormality in speech noted. /70 (Site: Left Upper Arm, Position: Sitting)   Pulse 85   Temp 98.1 °F (36.7 °C) (Infrared)   Resp 14   Ht 5' 11\" (1.803 m)   Wt 182 lb (82.6 kg)   SpO2 94%   BMI 25.38 kg/m²     Head:   Normocephalic, atraumatic. No obvious masses or lesions noted. Nose:    External nose: Appears midline. No obvious deformity or masses. Septum:  Relatively midline. No septal hematoma. No perforation. Mucosa:  clear  Turbinates: hypertrophic and congested            Discharge:  clear    Mouth/Throat:  Lips, tongue and oral cavity: Normal. No masses or lesions noted   Dentition: fair, no malocclusion  Oral mucosa: moist  Tonsils: present, not acutely enlarged and no erythema or exudates  Oropharynx: normal-appearing mucosa  Hard and soft palates: symmetrical and intact. Salivary glands: not enlarged and no tenderness to palpation.   Uvula: midline, no obvious lesions   Gag reflex is present. Neck: Trachea midline. Thyroid not enlarged, no palpable masses or tenderness. Lymphatic: No cervical lymphadenopathy noted. Eyes: VALERIA, EOM intact. Conjunctiva moist without discharge. Lungs: Mild wheezing in bilateral upper lobes, but otherwise lung sounds clear and laminar throughout. Normal effort of breathing, not obviously distressed. Cardiac: Normal rate and rhythm. Neuro: Cranial nerves II-XII grossly intact. Extremities: No clubbing, edema, or cyanosis noted. Data:    Radiology Review:  CT Facial bones wo contrast 3/7/22  FINDINGS:           Frontal sinuses: Mild mucosal thickening in the frontal sinuses bilaterally, new since previous study dated 17th of December 2021. .       Ethmoid air cells: Moderate mucosal thickening in ethmoid air cells bilaterally. . The lamina papyracea are intact. The cribriform plates and fovea ethmoidalis are relatively symmetric.       Sphenoid sinus: Normally aerated and pneumatized. .       Maxillary sinuses: Moderate mucosal thickening in the maxillary sinuses bilaterally. There is narrowing of the ostium of the left maxillary sinus. There is an ectopic tooth in the floor of the right maxillary sinus, unchanged.       Nasal cavity: The nasal septum is slightly deviated towards the left side. There is a dante bullosa on the left side. . No polyps or masses are noted.       The mastoid air cells and middle ear cavities are normally aerated.       There are no suspicious findings in the imaged aspects of the brain parenchyma and facial soft tissues.           Impression       1. Moderate mucosal thickening in ethmoid air cells and maxillary sinuses bilaterally   2. Mild mucosal thickening in the frontal sinuses bilaterally, worse than on previous study dated 17th of December 2021.   3. Narrowing of the ostium of the left maxillary sinus. 4. Ectopic tooth in the floor of the right maxillary sinus.    5. Mild deviation of the nasal edit the dictation but occasionally words are mis-transcribed.)    Electronically signed by ANT Stephens on 3/7/2022 at 2:30 PM

## 2022-04-15 ENCOUNTER — HOSPITAL ENCOUNTER (OUTPATIENT)
Dept: PULMONOLOGY | Age: 57
Discharge: HOME | End: 2022-04-15
Payer: COMMERCIAL

## 2022-04-15 DIAGNOSIS — R05.3 CHRONIC COUGH: ICD-10-CM

## 2022-04-15 PROCEDURE — 94726 PLETHYSMOGRAPHY LUNG VOLUMES: CPT

## 2022-04-15 PROCEDURE — 94010 BREATHING CAPACITY TEST: CPT

## 2022-04-15 PROCEDURE — 94729 DIFFUSING CAPACITY: CPT

## 2022-04-28 ENCOUNTER — TELEPHONE (OUTPATIENT)
Dept: ENT CLINIC | Age: 57
End: 2022-04-28

## 2022-04-28 NOTE — TELEPHONE ENCOUNTER
Soraya called today to let us know she has been dealing with some wheezing. She saw her PCP and he ordered a PFT. She had that done and is being referred to pulmonary for a consult. Her PCP suggested she hold off on her sinus surgery until after she sees the pulmonary physician (6/13/22). Soraya said she will call back to reschedule after that appointment.

## 2022-05-01 NOTE — TELEPHONE ENCOUNTER
That is fine if she wishes. Unfortunately sinusitis can worsen wheezing so if she becomes worse rather than better, she may need to reconsider the postponement. Please let her know.     PFC

## 2022-06-13 ENCOUNTER — TELEPHONE (OUTPATIENT)
Dept: PULMONOLOGY | Age: 57
End: 2022-06-13

## 2022-06-13 NOTE — TELEPHONE ENCOUNTER
Patient called in at approx 745 am to reschedule her new patient appt that was scheduled for today 6/13/2022 at 8:00 am. She was exposed to covid, please call her to reschedule due to being a new patient.

## 2022-10-13 ENCOUNTER — OFFICE VISIT (OUTPATIENT)
Dept: PULMONOLOGY | Age: 57
End: 2022-10-13
Payer: COMMERCIAL

## 2022-10-13 ENCOUNTER — NURSE ONLY (OUTPATIENT)
Dept: LAB | Age: 57
End: 2022-10-13

## 2022-10-13 VITALS
DIASTOLIC BLOOD PRESSURE: 82 MMHG | WEIGHT: 193 LBS | BODY MASS INDEX: 27.02 KG/M2 | HEART RATE: 78 BPM | HEIGHT: 71 IN | TEMPERATURE: 98.2 F | SYSTOLIC BLOOD PRESSURE: 122 MMHG | OXYGEN SATURATION: 97 %

## 2022-10-13 DIAGNOSIS — R06.2 WHEEZING: ICD-10-CM

## 2022-10-13 DIAGNOSIS — J45.902 REACTIVE AIRWAY DISEASE WITH STATUS ASTHMATICUS, UNSPECIFIED ASTHMA SEVERITY, UNSPECIFIED WHETHER PERSISTENT: ICD-10-CM

## 2022-10-13 DIAGNOSIS — R06.02 SOB (SHORTNESS OF BREATH): Primary | ICD-10-CM

## 2022-10-13 DIAGNOSIS — R06.02 SOB (SHORTNESS OF BREATH): ICD-10-CM

## 2022-10-13 DIAGNOSIS — R94.2 RESTRICTIVE PATTERN PRESENT ON PULMONARY FUNCTION TESTING: ICD-10-CM

## 2022-10-13 DIAGNOSIS — R06.83 SNORING: ICD-10-CM

## 2022-10-13 DIAGNOSIS — R05.3 CHRONIC COUGH: ICD-10-CM

## 2022-10-13 LAB
BASOPHILS # BLD: 0.8 %
BASOPHILS ABSOLUTE: 0 THOU/MM3 (ref 0–0.1)
EOSINOPHIL # BLD: 4.6 %
EOSINOPHILS ABSOLUTE: 0.2 THOU/MM3 (ref 0–0.4)
ERYTHROCYTE [DISTWIDTH] IN BLOOD BY AUTOMATED COUNT: 12.7 % (ref 11.5–14.5)
ERYTHROCYTE [DISTWIDTH] IN BLOOD BY AUTOMATED COUNT: 42.5 FL (ref 35–45)
HCT VFR BLD CALC: 42 % (ref 37–47)
HEMOGLOBIN: 13.8 GM/DL (ref 12–16)
IMMATURE GRANS (ABS): 0.01 THOU/MM3 (ref 0–0.07)
IMMATURE GRANULOCYTES: 0.2 %
LYMPHOCYTES # BLD: 42 %
LYMPHOCYTES ABSOLUTE: 2.1 THOU/MM3 (ref 1–4.8)
MCH RBC QN AUTO: 30.3 PG (ref 26–33)
MCHC RBC AUTO-ENTMCNC: 32.9 GM/DL (ref 32.2–35.5)
MCV RBC AUTO: 92.1 FL (ref 81–99)
MONOCYTES # BLD: 7.1 %
MONOCYTES ABSOLUTE: 0.4 THOU/MM3 (ref 0.4–1.3)
NUCLEATED RED BLOOD CELLS: 0 /100 WBC
PLATELET # BLD: 281 THOU/MM3 (ref 130–400)
PMV BLD AUTO: 11.4 FL (ref 9.4–12.4)
RBC # BLD: 4.56 MILL/MM3 (ref 4.2–5.4)
SEG NEUTROPHILS: 45.3 %
SEGMENTED NEUTROPHILS ABSOLUTE COUNT: 2.3 THOU/MM3 (ref 1.8–7.7)
WBC # BLD: 5 THOU/MM3 (ref 4.8–10.8)

## 2022-10-13 PROCEDURE — 94664 DEMO&/EVAL PT USE INHALER: CPT | Performed by: NURSE PRACTITIONER

## 2022-10-13 PROCEDURE — 94618 PULMONARY STRESS TESTING: CPT | Performed by: NURSE PRACTITIONER

## 2022-10-13 PROCEDURE — 94010 BREATHING CAPACITY TEST: CPT | Performed by: NURSE PRACTITIONER

## 2022-10-13 PROCEDURE — 95012 NITRIC OXIDE EXP GAS DETER: CPT | Performed by: NURSE PRACTITIONER

## 2022-10-13 PROCEDURE — 99204 OFFICE O/P NEW MOD 45 MIN: CPT | Performed by: NURSE PRACTITIONER

## 2022-10-13 RX ORDER — ALBUTEROL SULFATE 90 UG/1
2 AEROSOL, METERED RESPIRATORY (INHALATION) EVERY 4 HOURS PRN
Qty: 54 G | Refills: 3 | Status: SHIPPED | OUTPATIENT
Start: 2022-10-13

## 2022-10-13 RX ORDER — FLUTICASONE FUROATE AND VILANTEROL 200; 25 UG/1; UG/1
1 POWDER RESPIRATORY (INHALATION) DAILY
Qty: 1 EACH | Refills: 11 | Status: SHIPPED | OUTPATIENT
Start: 2022-10-13 | End: 2023-10-13

## 2022-10-13 RX ORDER — MONTELUKAST SODIUM 10 MG/1
10 TABLET ORAL NIGHTLY
Qty: 90 TABLET | Refills: 3 | Status: SHIPPED | OUTPATIENT
Start: 2022-10-13

## 2022-10-13 ASSESSMENT — ENCOUNTER SYMPTOMS
ALLERGIC/IMMUNOLOGIC NEGATIVE: 1
EYES NEGATIVE: 1
COUGH: 0
WHEEZING: 1
GASTROINTESTINAL NEGATIVE: 1
SHORTNESS OF BREATH: 1

## 2022-10-13 NOTE — PROGRESS NOTES
[facility]    Patient: Saundra Oliva, 59 y.o.   : 1965  10/13/2022         Subjective     Chief Complaint   Patient presents with    New Patient     New sob patient pft prior        HPI  Soraya is here for evaluation of shortness of breath with referral from PCP. Here today for chronic cough and wheezing   Restrictive pattern on PFT- no FINESSE given to see if evoked a BD response   Albuterol at home and it does help  Never a smoker   Wheezing a lot and at night   Chronic cough   Upcoming sinus surgery per Dr. Abby Roew   No home oxygen use     Asthma control (Severity) questionnaire:  Asthma symptoms:  > 2 times per week -> Yes   Night time awakenings: > 2 times per month -> Yes  Use of short acting beta agonist for symptoms control (Other than pre exercise use) :> 2times per week  -> Yes  Interference with normal activity  -> mild  Lung function: Fev1 >60% Predicted  -> Yes  Number of exacerbations per year: > 2 -> No    Triggers were wearing a mask or respirator works as nurse in the ICU  Alleviating factors- Albuterol   Timing-exertion, certain times of day- wheezing at night   Associated symptoms- NA  Exercise tolerance number of steps distance on level groun    Patient was never diagnosed with chronic respiratory condition ie asthma, COPD, or ILD. Patient has not been admitted or treated for pneumonia, pulmonary embolism, or respiratory failure. Patient has not been intubated for reasons other than planned procedures. Social History  Patient job history included  She has not had exposure to aerosolized particles or hazardous fumes. 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 denies to active tobacco smoking 0 PPD for 0 years for 0 pack years   admits to exposure to pets/animals at home.   denies exposure to tuberculosis. admits to hobbies they can no longer perform due to shortness of breath    Patient is experiencing SOB: yes, when she was wheezing     Patient is experiencing wheezing: Yes     Patient states they have had a productive cough. Phlegm is color:  gray     Patient is coughing up blood: no     Patient has been experiencing chest pains: non-existent     Patient is currently taking the following inhaler(s): Albuterol     Patient is NOT using their rescue inhaler. Past Medical hx   PMH:  Past Medical History:   Diagnosis Date    Frequent PVCs      SURGICAL HISTORY:  Past Surgical History:   Procedure Laterality Date    OTHER SURGICAL HISTORY  26 Nov 2013    C5-7 ACDF (Dr. Josue Jett, [facility])    TUBAL LIGATION       SOCIAL HISTORY:  Social History     Tobacco Use    Smoking status: Never    Smokeless tobacco: Never   Substance Use Topics    Alcohol use: Yes     Comment: occas    Drug use: No     ALLERGIES:  Allergies   Allergen Reactions    Bee Venom Swelling    Amoxicillin Swelling     FAMILY HISTORY:  Family History   Problem Relation Age of Onset    Heart Disease Father     Early Death Father     Other Mother         sudden death    Other Brother         WPW, Cardiomyopathy-ICD     CURRENT MEDICATIONS:  Current Outpatient Medications   Medication Sig Dispense Refill    montelukast (SINGULAIR) 10 MG tablet Take 1 tablet by mouth nightly 90 tablet 3    Fluticasone furoate-vilanterol (BREO ELLIPTA) 200-25 MCG/INH AEPB inhaler Inhale 1 puff into the lungs daily Rinse mouth after its use. 1 each 11    albuterol sulfate HFA (VENTOLIN HFA) 108 (90 Base) MCG/ACT inhaler Inhale 2 puffs into the lungs every 4 hours as needed for Wheezing or Shortness of Breath 54 g 3    Multiple Vitamins-Minerals (THERAPEUTIC MULTIVITAMIN-MINERALS) tablet Take 1 tablet by mouth daily. No current facility-administered medications for this visit. Martha BUCKNER   Review of Systems   Constitutional: Negative.   Negative for chills and fever. HENT: Negative. Negative for congestion. Eyes: Negative. Respiratory:  Positive for shortness of breath and wheezing. Negative for cough. Cardiovascular: Negative. Negative for chest pain and leg swelling. Gastrointestinal: Negative. Endocrine: Negative. Genitourinary: Negative. Musculoskeletal: Negative. Allergic/Immunologic: Negative. Neurological: Negative. Hematological: Negative. Psychiatric/Behavioral: Negative. Negative for sleep disturbance. Physical exam   /82 (Site: Right Upper Arm, Position: Sitting, Cuff Size: Medium Adult)   Pulse 78   Temp 98.2 °F (36.8 °C) (Skin)   Ht 5' 11\" (1.803 m)   Wt 193 lb (87.5 kg)   SpO2 97%   BMI 26.92 kg/m²    Wt Readings from Last 3 Encounters:   10/13/22 193 lb (87.5 kg)   03/07/22 182 lb (82.6 kg)   01/24/22 179 lb 14.4 oz (81.6 kg)       Physical Exam  Vitals and nursing note reviewed. Constitutional:       Appearance: She is well-developed. HENT:      Head: Normocephalic and atraumatic. Mouth/Throat:      Comments: MP 3  Eyes:      Conjunctiva/sclera: Conjunctivae normal.      Pupils: Pupils are equal, round, and reactive to light. Neck:      Vascular: No JVD. Cardiovascular:      Rate and Rhythm: Normal rate and regular rhythm. Heart sounds: Normal heart sounds. No murmur heard. No friction rub. No gallop. Pulmonary:      Effort: Pulmonary effort is normal. No respiratory distress. Breath sounds: Normal breath sounds. No wheezing or rales. Abdominal:      General: Bowel sounds are normal.      Palpations: Abdomen is soft. Musculoskeletal:         General: Normal range of motion. Cervical back: Normal range of motion and neck supple. Skin:     General: Skin is warm and dry. Capillary Refill: Capillary refill takes less than 2 seconds. Neurological:      Mental Status: She is alert and oriented to person, place, and time.    Psychiatric: Behavior: Behavior normal.         Thought Content: Thought content normal.         Judgment: Judgment normal.        results   Lung Nodule Screening     [] Qualifies    [x] Does not qualify   [] Declined    [] Completed     [facility] [place] annual screening for lung cancer with low-dose computed tomography (LDCT) in adults aged 48 to [de-identified] years who have a 20 pack-year smoking history and currently smoke or have quit within the past 15 years. Screeningshould be discontinued once a person has not smoked for 15 years or develops a health problem that substantially limits life expectancy or the ability or willingness to have curative lung surgery. Six Minute Walk Test  Soraya Yanez 1965    Six minute walk test done in my office today by my medical assistant. Soraya's oxygen saturation at rest on room air was 99%. Her oxygen saturation dropped to 97% on room air with exertion after walking 864 feet and within 6 minutes. Patient ambulated a total of 864 feet with oxygen. Resting Dyspnea/Echo score was 0 / 0  and 0 / 0  upon completion of the walk. Resting heart rate was  78 bpm and 97 bpm upon completing the walk. Assessment      Diagnosis Orders   1. SOB (shortness of breath)  6 Minute Walk Test    IgE    CBC with Auto Differential    albuterol sulfate HFA (VENTOLIN HFA) 108 (90 Base) MCG/ACT inhaler      2. Wheezing  POCT Nitric Oxide    IgE    CBC with Auto Differential    albuterol sulfate HFA (VENTOLIN HFA) 108 (90 Base) MCG/ACT inhaler      3. Restrictive pattern present on pulmonary function testing        4. Chronic cough        5. Reactive airway disease with status asthmaticus, unspecified asthma severity, unspecified whether persistent  montelukast (SINGULAIR) 10 MG tablet    Fluticasone furoate-vilanterol (BREO ELLIPTA) 200-25 MCG/INH AEPB inhaler    IgE    CBC with Auto Differential      6.  Snoring              Acute: asthma, COPD, LRTI, PE, PTX, panic attacks/psychosomatic, metabolic acidosis  Chronic: COPD, ILD, PEF, Bronchiectasis, chronic infection(TB), Heart failure, chronic arrhythmia, anemia, thyroid disease    Plan   -6MWT today in the office no oxygen needed with rest or activity   -NIOX today in the office 175 ppb very significant airway inflammation - asthmatic response no bronchial challenge needed   -Start Singulair 10 mg PO nightly   -Continue Albuterol PRN SOB/wheezing   -CBC w/auto and IgE  --Will start patient on Breo Ellipta 200/25 mcg/blister DPI 1puff daily in am. Soraya Yanez educated and demonstrated by me in my office how to use Breo Ellipta 100/25 mcg/blister DPI. Patient verbalizes understanding. She was detailed about mechanism of action of drug along with associated side effects. She agreed to take the risk and medication. She verbalizes understanding. Demonstrated inhaler use in office   -Advised to maintain pneumonia vaccine with PCP and to take flu vaccine this coming season.  -Advised patient to call office with any changes, questions, or concerns regarding respiratory status  -Discuss PSG next time ?  VENKAT    Will see Soraya Yanezback in: 3 months    Ab Miguel  10/13/2022

## 2022-10-13 NOTE — PROGRESS NOTES
Patient is experiencing SOB: yes, when she was wheezing    Patient is experiencing wheezing: Yes    Patient states they have had a productive cough. Phlegm is color:  gray    Patient is coughing up blood: no    Patient has been experiencing chest pains: non-existent    Patient is currently taking the following inhaler(s): Albuterol    Patient is NOT using their rescue inhaler.

## 2022-10-14 LAB — IGE: 33 IU/ML

## 2023-01-12 ENCOUNTER — PATIENT MESSAGE (OUTPATIENT)
Dept: ENT CLINIC | Age: 58
End: 2023-01-12

## 2023-01-12 DIAGNOSIS — J34.3 NASAL TURBINATE HYPERTROPHY: ICD-10-CM

## 2023-01-12 DIAGNOSIS — J32.1 CHRONIC FRONTAL SINUSITIS: ICD-10-CM

## 2023-01-12 DIAGNOSIS — R09.82 PND (POST-NASAL DRIP): ICD-10-CM

## 2023-01-12 DIAGNOSIS — J32.0 CHRONIC MAXILLARY SINUSITIS: Primary | ICD-10-CM

## 2023-01-12 DIAGNOSIS — J32.2 CHRONIC ETHMOIDAL SINUSITIS: ICD-10-CM

## 2023-01-12 DIAGNOSIS — R05.3 CHRONIC COUGH: ICD-10-CM

## 2023-01-16 ASSESSMENT — ENCOUNTER SYMPTOMS
GASTROINTESTINAL NEGATIVE: 1
SHORTNESS OF BREATH: 0
ALLERGIC/IMMUNOLOGIC NEGATIVE: 1
COUGH: 0
EYES NEGATIVE: 1

## 2023-01-16 NOTE — PROGRESS NOTES
[facility]    Patient: Mariangel Bond, 59 y.o.   : 1965      Subjective     Chief Complaint   Patient presents with    Follow-up     3 mo med check sob     HPI  Soraya is here for medication check last visit patient was started on Breo and Singulair daily  NIOX done last office visit was severely elevated at 175 ppb  Breo the patient could not tolerate due to shakiness  Singulair patient stated changed her mood   Only using Albuterol and only 1-2 times per week   Originally seen in the office for SOB consult  Never a smoker   Not overweight  Mild restrictive patter seen on last PFT testing   Patient was following with ENT (w/Dr. Indio Galeas) patient states she needs sinus surgery we discussed follow up with them as this could be what's causing a majority of her airway inflammation   Patient is experiencing SOB: no     Patient is experiencing wheezing: Yes     Patient states they have had a Strong, productive = 1 cough. Phlegm is color:  clear. .. daily     Patient is coughing up blood: no     Patient has been experiencing chest pains: non-existent     Patient is currently taking the following inhaler(s): albuterol     Patient is currently taking the following nebulizer treatment(s): no     Patient is using their rescue inhaler 1 times per week. Patient is currently using - liters of oxygen during - n/a. Patient needs refills of the following medications: no     All refills should be sent to n/a     Other: n/a    Asthma control (Severity) questionnaire:  Asthma symptoms:  > 2 times per week -> No   Night time awakenings: > 2 times per month -> No  Use of short acting beta agonist for symptoms control (Other than pre exercise use) :> 2times per week  -> Yes  Interference with normal activity  -> mild  Lung function: Fev1 >60% Predicted  -> No  Number of exacerbations per year: > 2 -> No     Progress History:   Since last visit any new medical issues? No  New ER or hospital visits? No  Any new or changes in medicines? No  Using inhalers? Yes   Are they helpful? Yes   Flu vaccine- UTD 11/23/22  Pneumonia vaccine- none per patient     Past Medical hx   PMH:  Past Medical History:   Diagnosis Date    Frequent PVCs      SURGICAL HISTORY:  Past Surgical History:   Procedure Laterality Date    OTHER SURGICAL HISTORY  26 Nov 2013    C5-7 ACDF (Dr. Azeb Goetz, [facility])    TUBAL LIGATION       SOCIAL HISTORY:  Social History     Tobacco Use    Smoking status: Never    Smokeless tobacco: Never   Substance Use Topics    Alcohol use: Yes     Comment: occas    Drug use: No     ALLERGIES:  Allergies   Allergen Reactions    Bee Venom Swelling    Amoxicillin Swelling     FAMILY HISTORY:  Family History   Problem Relation Age of Onset    Heart Disease Father     Early Death Father     Other Mother         sudden death    Other Brother         WPW, Cardiomyopathy-ICD     CURRENT MEDICATIONS:  Current Outpatient Medications   Medication Sig Dispense Refill    beclomethasone (QVAR REDIHALER) 80 MCG/ACT AERB inhaler Inhale 1 puff into the lungs in the morning and 1 puff in the evening. 1 each 11    albuterol sulfate HFA (VENTOLIN HFA) 108 (90 Base) MCG/ACT inhaler Inhale 2 puffs into the lungs every 4 hours as needed for Wheezing or Shortness of Breath 54 g 3    Multiple Vitamins-Minerals (THERAPEUTIC MULTIVITAMIN-MINERALS) tablet Take 1 tablet by mouth daily. No current facility-administered medications for this visit. ROS   Review of Systems   Constitutional: Negative. Negative for chills and fever. HENT:  Positive for congestion and postnasal drip. Eyes: Negative. Respiratory:  Positive for wheezing. Negative for cough and shortness of breath. Cardiovascular: Negative. Negative for chest pain and leg swelling. Gastrointestinal: Negative. Endocrine: Negative. Genitourinary: Negative. Musculoskeletal: Negative. Allergic/Immunologic: Negative. Neurological: Negative. Hematological: Negative. Psychiatric/Behavioral: Negative. Negative for sleep disturbance. Physical exam   /72 (Site: Right Upper Arm, Position: Sitting, Cuff Size: Medium Adult)   Pulse 80   Temp 97.8 °F (36.6 °C) (Infrared)   Ht 5' 11\" (1.803 m)   Wt 172 lb (78 kg)   SpO2 96%   BMI 23.99 kg/m²    Wt Readings from Last 3 Encounters:   01/17/23 172 lb (78 kg)   10/13/22 193 lb (87.5 kg)   03/07/22 182 lb (82.6 kg)       Physical Exam  Vitals and nursing note reviewed. Constitutional:       Appearance: She is well-developed. HENT:      Head: Normocephalic and atraumatic. Eyes:      Conjunctiva/sclera: Conjunctivae normal.      Pupils: Pupils are equal, round, and reactive to light. Neck:      Vascular: No JVD. Cardiovascular:      Rate and Rhythm: Normal rate and regular rhythm. Heart sounds: Normal heart sounds. No murmur heard. No friction rub. No gallop. Pulmonary:      Effort: Pulmonary effort is normal. No respiratory distress. Breath sounds: Normal breath sounds. No wheezing or rales. Abdominal:      General: Bowel sounds are normal.      Palpations: Abdomen is soft. Musculoskeletal:         General: Normal range of motion. Cervical back: Normal range of motion and neck supple. Skin:     General: Skin is warm and dry. Capillary Refill: Capillary refill takes less than 2 seconds. Neurological:      Mental Status: She is alert and oriented to person, place, and time. Psychiatric:         Behavior: Behavior normal.         Thought Content:  Thought content normal.         Judgment: Judgment normal.        Results   Lung Nodule Screening     [] Qualifies    [x] Does not qualify   [] Declined    [] Completed    The [employer]PSTF recommends annual screening for lung cancer with low-dose computed tomography (LDCT) in adults aged 48 to 80 years who have a 30 pack-year smoking history and currently smoke or have quit within the past 20 years. Screening should be discontinued once a person has not smoked for 20 years or develops a health problem that substantially limits life expectancy or the ability or willingness to have curative lung surgery. Assessment      Diagnosis Orders   1. Reactive airway disease with status asthmaticus, unspecified asthma severity, unspecified whether persistent  POCT Nitric Oxide    beclomethasone (QVAR REDIHALER) 80 MCG/ACT AERB inhaler      2. Restrictive pattern present on pulmonary function testing        3. Chronic pansinusitis              Plan   -Soraya Yanez educated about environmental safety precautions she need to practice to prevent exacerbation ofher Asthma.  Soraya Yanez verbalizes understanding.   -Stop Breo due to S/E  -Stop Singulair due to S/E  -Continue Albuterol PRN for SOB/wheezing   -RX done for QVAR 80 mcg unsure if this will improve her airway inflammation when some of this could be caused due to her sinus issues- instructed to call office with any side effects or cost issues  -NIOX done this visit still severely elevated at 150 ppb  -Instructed to rinse mouth after use of QVAR to decrease chances of thrush  -Will send office note today to ENT office and told patient she needs to follow up with them as well   -Advised to maintain pneumonia vaccine with PCP and to take flu vaccine this coming season.  -Advised patient to call office with any changes, questions, or concerns regarding respiratory status    Will see Soraya Yanez back in: 6 months     [place] Stage, CNP  1/17/2023

## 2023-01-17 ENCOUNTER — OFFICE VISIT (OUTPATIENT)
Dept: PULMONOLOGY | Age: 58
End: 2023-01-17
Payer: COMMERCIAL

## 2023-01-17 VITALS
BODY MASS INDEX: 24.08 KG/M2 | TEMPERATURE: 97.8 F | WEIGHT: 172 LBS | HEIGHT: 71 IN | HEART RATE: 80 BPM | DIASTOLIC BLOOD PRESSURE: 72 MMHG | OXYGEN SATURATION: 96 % | SYSTOLIC BLOOD PRESSURE: 116 MMHG

## 2023-01-17 DIAGNOSIS — R94.2 RESTRICTIVE PATTERN PRESENT ON PULMONARY FUNCTION TESTING: ICD-10-CM

## 2023-01-17 DIAGNOSIS — J32.4 CHRONIC PANSINUSITIS: ICD-10-CM

## 2023-01-17 DIAGNOSIS — J45.902 REACTIVE AIRWAY DISEASE WITH STATUS ASTHMATICUS, UNSPECIFIED ASTHMA SEVERITY, UNSPECIFIED WHETHER PERSISTENT: Primary | ICD-10-CM

## 2023-01-17 PROCEDURE — 99214 OFFICE O/P EST MOD 30 MIN: CPT | Performed by: NURSE PRACTITIONER

## 2023-01-17 PROCEDURE — 95012 NITRIC OXIDE EXP GAS DETER: CPT | Performed by: NURSE PRACTITIONER

## 2023-01-17 ASSESSMENT — ENCOUNTER SYMPTOMS: WHEEZING: 1

## 2023-01-17 NOTE — PROGRESS NOTES
Patient is experiencing SOB: no    Patient is experiencing wheezing: Yes    Patient states they have had a Strong, productive = 1 cough. Phlegm is color:  clear. .. daily    Patient is coughing up blood: no    Patient has been experiencing chest pains: non-existent    Patient is currently taking the following inhaler(s): albuterol    Patient is currently taking the following nebulizer treatment(s): no    Patient is using their rescue inhaler 1 times per week. Patient is currently using - liters of oxygen during - n/a.     Patient needs refills of the following medications: no    All refills should be sent to n/a    Other: n/a

## 2023-01-17 NOTE — TELEPHONE ENCOUNTER
Patient stopped in the office while she was in the building for another appointment. She is wanting to schedule her surgery now. Does she need a new CT Scan for Dr Paz Ba to review? Please advise.

## 2023-01-17 NOTE — Clinical Note
Good morning!!! I saw Ayden Dale U. 60. today in the office for her SOB. Started on Breo after last office visit due to NIOX of 175ppb indicating high airway inflammation. Soraya could not tolerate this medication today switched to just ICS. I told her she really needed to follow up with ENT due to her sinus issues as they could be causing a lot of her airway inflammation. I don't know if office will call her for a follow up appt I also directed her too as well. Repeat NIOX today in the office was 150 ppb this without any maintenance inhaler on board.  Wanted to keep you in the loop

## 2023-01-17 NOTE — TELEPHONE ENCOUNTER
Soraya has not been seen since March 7 of 2022 by Comfort Serna. She should probably have an appointment after her CT scan since this will be over a year by the time we can get her on the surgery schedule.

## 2023-01-23 DIAGNOSIS — J32.4 CHRONIC PANSINUSITIS: Primary | ICD-10-CM

## 2023-01-23 DIAGNOSIS — R05.3 CHRONIC COUGH: ICD-10-CM

## 2023-01-24 NOTE — TELEPHONE ENCOUNTER
Order placed. We will see if insurance has issues with letting us schedule the CT.  She may need seen sooner by [facility] or myself so we can get her set up on the sinus work up in preparation for CT

## 2023-01-24 NOTE — TELEPHONE ENCOUNTER
Per Dr Radha Coy, patient will need a new CT sinus prior to her appointment on 04/10/23. Please place orders for CT sinus for the patient.

## 2023-02-02 NOTE — TELEPHONE ENCOUNTER
I spoke to Soraya. She has a lot of people to work around at work for her surgery so she confirmed dates in April that work. Currently she is scheduled for a CT scan on 2/8/23. Office visit with Dr Terence Nava on 4/10/23 and I am holding surgery time for her on 4/21/23.

## 2023-02-08 ENCOUNTER — HOSPITAL ENCOUNTER (OUTPATIENT)
Dept: CT IMAGING | Age: 58
Discharge: HOME | End: 2023-02-08
Payer: COMMERCIAL

## 2023-02-08 DIAGNOSIS — J32.0 CHRONIC MAXILLARY SINUSITIS: ICD-10-CM

## 2023-02-08 DIAGNOSIS — J32.2 CHRONIC ETHMOIDAL SINUSITIS: ICD-10-CM

## 2023-02-08 DIAGNOSIS — J32.1 CHRONIC FRONTAL SINUSITIS: ICD-10-CM

## 2023-02-08 DIAGNOSIS — J34.3 NASAL TURBINATE HYPERTROPHY: ICD-10-CM

## 2023-02-08 DIAGNOSIS — R05.3 CHRONIC COUGH: ICD-10-CM

## 2023-02-08 DIAGNOSIS — R09.82 PND (POST-NASAL DRIP): ICD-10-CM

## 2023-02-08 PROCEDURE — 70486 CT MAXILLOFACIAL W/O DYE: CPT

## 2023-02-21 ENCOUNTER — OFFICE VISIT (OUTPATIENT)
Dept: FAMILY MEDICINE CLINIC | Age: 58
End: 2023-02-21
Payer: COMMERCIAL

## 2023-02-21 ENCOUNTER — NURSE ONLY (OUTPATIENT)
Dept: LAB | Age: 58
End: 2023-02-21

## 2023-02-21 VITALS
DIASTOLIC BLOOD PRESSURE: 64 MMHG | HEART RATE: 73 BPM | HEIGHT: 71 IN | TEMPERATURE: 98.1 F | BODY MASS INDEX: 23.3 KG/M2 | OXYGEN SATURATION: 99 % | SYSTOLIC BLOOD PRESSURE: 102 MMHG | RESPIRATION RATE: 10 BRPM | WEIGHT: 166.4 LBS

## 2023-02-21 DIAGNOSIS — L65.9 HAIR LOSS: ICD-10-CM

## 2023-02-21 DIAGNOSIS — E78.5 ELEVATED LIPIDS: Primary | ICD-10-CM

## 2023-02-21 DIAGNOSIS — R05.3 CHRONIC COUGH: ICD-10-CM

## 2023-02-21 DIAGNOSIS — J45.20 MILD INTERMITTENT REACTIVE AIRWAY DISEASE WITHOUT COMPLICATION: ICD-10-CM

## 2023-02-21 DIAGNOSIS — R53.83 TIRED: ICD-10-CM

## 2023-02-21 DIAGNOSIS — R35.1 NOCTURIA: ICD-10-CM

## 2023-02-21 DIAGNOSIS — R06.02 SHORTNESS OF BREATH: ICD-10-CM

## 2023-02-21 DIAGNOSIS — E78.5 ELEVATED LIPIDS: ICD-10-CM

## 2023-02-21 DIAGNOSIS — Z01.818 PRE-OP TESTING: ICD-10-CM

## 2023-02-21 PROBLEM — J45.909 REACTIVE AIRWAY DISEASE WITHOUT COMPLICATION: Status: ACTIVE | Noted: 2023-02-21

## 2023-02-21 LAB
AMYLASE SERPL-CCNC: 64 U/L (ref 20–104)
BASOPHILS ABSOLUTE: 0 THOU/MM3 (ref 0–0.1)
BASOPHILS NFR BLD AUTO: 0.6 %
DEPRECATED RDW RBC AUTO: 43 FL (ref 35–45)
EOSINOPHIL NFR BLD AUTO: 3.4 %
EOSINOPHILS ABSOLUTE: 0.2 THOU/MM3 (ref 0–0.4)
ERYTHROCYTE [DISTWIDTH] IN BLOOD BY AUTOMATED COUNT: 12.8 % (ref 11.5–14.5)
ERYTHROCYTE [SEDIMENTATION RATE] IN BLOOD BY WESTERGREN METHOD: 59 MM/HR (ref 0–20)
HCT VFR BLD AUTO: 38.7 % (ref 37–47)
HGB BLD-MCNC: 12.6 GM/DL (ref 12–16)
IMM GRANULOCYTES # BLD AUTO: 0.04 THOU/MM3 (ref 0–0.07)
IMM GRANULOCYTES NFR BLD AUTO: 0.6 %
IRON SERPL-MCNC: 30 UG/DL (ref 50–170)
LIPASE SERPL-CCNC: 32.4 U/L (ref 5.6–51.3)
LYMPHOCYTES ABSOLUTE: 2 THOU/MM3 (ref 1–4.8)
LYMPHOCYTES NFR BLD AUTO: 27 %
MAGNESIUM SERPL-MCNC: 2.2 MG/DL (ref 1.6–2.4)
MCH RBC QN AUTO: 30.1 PG (ref 26–33)
MCHC RBC AUTO-ENTMCNC: 32.6 GM/DL (ref 32.2–35.5)
MCV RBC AUTO: 92.6 FL (ref 81–99)
MONOCYTES ABSOLUTE: 0.4 THOU/MM3 (ref 0.4–1.3)
MONOCYTES NFR BLD AUTO: 5 %
NEUTROPHILS NFR BLD AUTO: 63.4 %
NRBC BLD AUTO-RTO: 0 /100 WBC
PLATELET # BLD AUTO: 366 THOU/MM3 (ref 130–400)
PMV BLD AUTO: 10.5 FL (ref 9.4–12.4)
PTH-INTACT SERPL-MCNC: 32.3 PG/ML (ref 15–65)
RBC # BLD AUTO: 4.18 MILL/MM3 (ref 4.2–5.4)
RHEUMATOID FACT SERPL-ACNC: < 10 IU/ML (ref 0–13)
SEGMENTED NEUTROPHILS ABSOLUTE COUNT: 4.6 THOU/MM3 (ref 1.8–7.7)
TIBC SERPL-MCNC: 222 UG/DL (ref 171–450)
WBC # BLD AUTO: 7.3 THOU/MM3 (ref 4.8–10.8)

## 2023-02-21 PROCEDURE — 99204 OFFICE O/P NEW MOD 45 MIN: CPT | Performed by: FAMILY MEDICINE

## 2023-02-21 SDOH — ECONOMIC STABILITY: FOOD INSECURITY: WITHIN THE PAST 12 MONTHS, THE FOOD YOU BOUGHT JUST DIDN'T LAST AND YOU DIDN'T HAVE MONEY TO GET MORE.: NEVER TRUE

## 2023-02-21 SDOH — HEALTH STABILITY: PHYSICAL HEALTH: ON AVERAGE, HOW MANY MINUTES DO YOU ENGAGE IN EXERCISE AT THIS LEVEL?: 30 MIN

## 2023-02-21 SDOH — HEALTH STABILITY: PHYSICAL HEALTH: ON AVERAGE, HOW MANY DAYS PER WEEK DO YOU ENGAGE IN MODERATE TO STRENUOUS EXERCISE (LIKE A BRISK WALK)?: 2 DAYS

## 2023-02-21 SDOH — ECONOMIC STABILITY: HOUSING INSECURITY
IN THE LAST 12 MONTHS, WAS THERE A TIME WHEN YOU DID NOT HAVE A STEADY PLACE TO SLEEP OR SLEPT IN A SHELTER (INCLUDING NOW)?: NO

## 2023-02-21 SDOH — ECONOMIC STABILITY: FOOD INSECURITY: WITHIN THE PAST 12 MONTHS, YOU WORRIED THAT YOUR FOOD WOULD RUN OUT BEFORE YOU GOT MONEY TO BUY MORE.: NEVER TRUE

## 2023-02-21 SDOH — ECONOMIC STABILITY: INCOME INSECURITY: HOW HARD IS IT FOR YOU TO PAY FOR THE VERY BASICS LIKE FOOD, HOUSING, MEDICAL CARE, AND HEATING?: NOT VERY HARD

## 2023-02-21 ASSESSMENT — ENCOUNTER SYMPTOMS
COUGH: 1
ABDOMINAL PAIN: 1
NAUSEA: 1
ROS SKIN COMMENTS: HAIR LOSS
SHORTNESS OF BREATH: 1
SORE THROAT: 1
SINUS PAIN: 1
SINUS PRESSURE: 1
VOMITING: 1
DIARRHEA: 1

## 2023-02-21 ASSESSMENT — PATIENT HEALTH QUESTIONNAIRE - PHQ9
1. LITTLE INTEREST OR PLEASURE IN DOING THINGS: 0
SUM OF ALL RESPONSES TO PHQ9 QUESTIONS 1 & 2: 0
SUM OF ALL RESPONSES TO PHQ QUESTIONS 1-9: 0
2. FEELING DOWN, DEPRESSED OR HOPELESS: 0
SUM OF ALL RESPONSES TO PHQ QUESTIONS 1-9: 0

## 2023-02-21 ASSESSMENT — SOCIAL DETERMINANTS OF HEALTH (SDOH)
WITHIN THE LAST YEAR, HAVE YOU BEEN HUMILIATED OR EMOTIONALLY ABUSED IN OTHER WAYS BY YOUR PARTNER OR EX-PARTNER?: NO
WITHIN THE LAST YEAR, HAVE YOU BEEN AFRAID OF YOUR PARTNER OR EX-PARTNER?: NO
WITHIN THE LAST YEAR, HAVE TO BEEN RAPED OR FORCED TO HAVE ANY KIND OF SEXUAL ACTIVITY BY YOUR PARTNER OR EX-PARTNER?: NO
WITHIN THE LAST YEAR, HAVE YOU BEEN KICKED, HIT, SLAPPED, OR OTHERWISE PHYSICALLY HURT BY YOUR PARTNER OR EX-PARTNER?: NO

## 2023-02-21 NOTE — PATIENT INSTRUCTIONS
Thank you   Thank you for trusting us with your healthcare needs. You may receive a survey regarding today's visit. It would help us out if you would take a few moments to provide your feedback. We value your input. Please bring in ALL medications BOTTLES, including inhalers, herbal supplements, over the counter, prescribed & non-prescribed medicine. The office would like actual medication bottles and a list.   Please note our OFFICE POLICIES:   Prior to getting your labs drawn, please check with your insurance company for benefits and eligibility of lab services. Often, insurance companies cover certain tests for preventative visits only. It is patient's responsibility to see what is covered. We are unable to change a diagnosis after the test has been performed. Lab orders will not be re-printed. Please hold onto your original lab orders and take them to your lab to be completed. If you no show your scheduled appointment three times, you will be dismissed from this practice. Reschedules must be completed 24 hours prior to your schedule appointment. If the list below has been completed, PLEASE FAX RECORDS TO OUR OFFICE @ [phone].  Once the records have been received we will update your records at our office:  Health Maintenance Due   Topic Date Due    Pneumococcal 0-64 years Vaccine (1 - PCV) Never done    Depression Screen  Never done    HIV screen  Never done    Hepatitis C screen  Never done    DTaP/Tdap/Td vaccine (1 - Tdap) Never done    Breast cancer screen  Never done    Shingles vaccine (1 of 2) Never done    COVID-19 Vaccine (3 - Booster for Moderna series) 03/24/2021    Colorectal Cancer Screen  11/06/2022

## 2023-02-21 NOTE — PROGRESS NOTES
[id number] [facility] [place] [address] [id number]  Dept: [phone]  Dept Fax: [phone]  Loc: [phone]      Mana Matthews is a 59 y.o. White female. Soraya  presents to the [place] [address] clinic today for   Chief Complaint   Patient presents with    New Patient     Wee protocol-    Fatigue     Hair loss   , and;   1. Elevated lipids    2. Mild intermittent reactive airway disease without complication    3. Chronic cough    4. Shortness of breath    5. Tired    6. Nocturia    7. Hair loss          I have reviewed Soraya Yanez medical, surgical and other pertinent history in detail, and have updated medication and allergy information in the computerized patient record. Clinical Care Team:     -Referring Provider for today's consult: self  -Primary Care Provider: Cece Warren MD    Medical/Surgical History:   She  has a past medical history of Asthma, Frequent PVCs, and Irritable bowel syndrome. Her  has a past surgical history that includes Tubal ligation and other surgical history (26 Nov 2013). Family/Social History:     Her family history includes Early Death in her father and mother; Heart Attack in her father; Heart Disease in her brother and father; Other in her brother and mother. She  reports that she has never smoked. She has never used smokeless tobacco. She reports that she does not currently use alcohol. She reports that she does not use drugs.     Medications/Allergies/Immunizations:     Her current medication(s) include   Current Outpatient Medications:     Probiotic Product (PROBIOTIC ADVANCED PO), Take by mouth ULTRAFLORA SPECTRUM- 1 CAPSULE DAILY, Disp: , Rfl:     B Complex-Folic Acid (K-838 BALANCED TR PO), Take 1 tablet by mouth every morning [id number] [facility], Disp: , Rfl:     Cholecalciferol (VITAMIN D3) 25 MCG (1000 UT) CAPS, Take 2,000 Units by mouth daily, Disp: , Rfl:     albuterol sulfate HFA (VENTOLIN HFA) 108 (90 Base) MCG/ACT inhaler, Inhale 2 puffs into the lungs every 4 hours as needed for Wheezing or Shortness of Breath, Disp: 54 g, Rfl: 3    Multiple Vitamins-Minerals (THERAPEUTIC MULTIVITAMIN-MINERALS) tablet, Take 1 tablet by mouth daily. , Disp: , Rfl:   Allergies: Bee venom  Immunizations:   Immunization History   Administered Date(s) Administered    COVID-19, MODERNA BLUE border, Primary or Immunocompromised, (age 12y+), IM, 100 mcg/0.5mL 12/30/2020, 01/27/2021    Influenza, FLUCELVAX, (age 10 mo+), MDCK, PF, 0.5mL 11/24/2019, 11/30/2020    Influenza, Triv, 3 Years and older, IM (Rudolpho Wojciech (5 yrs and older) 11/23/2021, 11/23/2022        History of Present Illness:     Soraya's had concerns including New Patient (Wee protocol-) and Fatigue (Hair loss). .Soraya  presents to the [address] [place] [address] today for;   Chief Complaint   Patient presents with    New Patient     Wee protocol-    Fatigue     Hair loss   , abnormal labs follow up and these conditions as she  Is looking today for:     1. Elevated lipids    2. Mild intermittent reactive airway disease without complication    3. Chronic cough    4. Shortness of breath    5. Tired    6. Nocturia    7. Hair loss      HPI    Subjective:     Review of Systems   Constitutional:  Positive for chills, diaphoresis, fatigue and unexpected weight change. HENT:  Positive for congestion, sinus pressure, sinus pain and sore throat. Eyes:  Positive for visual disturbance. Respiratory:  Positive for cough and shortness of breath. Cardiovascular: Negative. Gastrointestinal:  Positive for abdominal pain, diarrhea, nausea and vomiting. Endocrine: Positive for cold intolerance. Genitourinary:  Positive for frequency. Musculoskeletal: Negative. Skin:  Positive for rash. HAIR LOSS   Allergic/Immunologic: Positive for environmental allergies. Neurological:  Positive for weakness and headaches.    Hematological: Bruises/bleeds easily. Psychiatric/Behavioral:  Positive for decreased concentration and dysphoric mood. Negative for sleep disturbance. All other systems reviewed and are negative. Objective:     /64 (Site: Right Upper Arm, Position: Sitting, Cuff Size: Medium Adult)   Pulse 73   Temp 98.1 °F (36.7 °C) (Temporal)   Resp 10   Ht 5' 11\" (1.803 m)   Wt 166 lb 6.4 oz (75.5 kg)   SpO2 99%   BMI 23.21 kg/m²   Physical Exam  Vitals and nursing note reviewed. Constitutional:       Appearance: Normal appearance. HENT:      Head: Normocephalic. Pulmonary:      Effort: Pulmonary effort is normal.   Neurological:      Mental Status: She is alert. Psychiatric:         Mood and Affect: Mood normal.         Thought Content: Thought content normal.          Laboratory Data:   Lab results were searched in Care Everywhere and/or those brought by the pateint were reviewed today with Soraya and she has a copy of their most recent labs to take home with them as noted below;       Imaging Data:   Imaging Data:       Assessment & Plan:       Impression:  1. Elevated lipids    2. Mild intermittent reactive airway disease without complication    3. Chronic cough    4. Shortness of breath    5. Tired    6. Nocturia    7. Hair loss      Assessment and Plan:  After reviewing the patients chief complaints, reviewing their labfindings in great detail (with the patient and those accompanying them) which correlate to their chief complaints, symptoms, and or medical conditions; suggestions were made relating to changes in diet and or supplements which may improve the complaints and which will be reflected in their future lab findings; Chief Complaint   Patient presents with    New Patient     Wee protocol-    Fatigue     Hair loss   ;    Plans for the next visits:  - Abnormal and non-optimal Labs were ordered today to be repeated in the next 120-365 days to assess changes from adjustments in nutrition and or nutrients. - Patient instructed when having a blood draw to ask the  to divide their lab draws into multiple draws over several days if not feeling good at the time of the lab draw or if either prefers to do several smaller blood draws over several days  -Patient instructed to check with insurer before each lab draw and to go to the lab which the insurer directs them for the most cost effective lab draw with the least patient's cost  - Soraya  will be scheduled subsequent to those results. - Soraya will bring in her drink, food, supplement log to her next visit    Chronic Problems Addressed on this Visit:                                   1.  Intensity of Service; Uncontrolled items at this visit; Chief Complaint   Patient presents with    New Patient     Wee protocol-    Fatigue     Hair loss   ; Improved items at this visit and Stable items were discussed at this visit;  2. Patients food, drinks, supplements and symptoms were reviewed with the patient,       - Soraya will bring food, drink, supplements and symptoms log to next visit for inclusion in their record      - 75 better food list reviewed & given to patient with the omega 6 food list to avoid      - The 52 Latex foods list was reviewed and given to the patients with the information on carrageenan         - Gluten in corn and oats abstracts sheet reviewed and given to the patient today   3.    Greater than 45 minutes time was spent with the patient face to face on this visit; of which >50% was for counseling and coordination of care, as well as the time spent before and after the visit reviewing the chart, documenting the encounter, reviewing labs,reports, NIH listed studies, making phone calls, etc.      Patients food and drinks were reviewed with the patient,   - They will bring a food drink symptom log to future visits for inclusion in their record    - 75 better food list reviewed & given to patient along with the omega 6 food list to avoid - Gluten in corn and oats abstracts sheet reviewed and given to the patient today    - 23 Foods containing Latex-like proteins was reviewed and copy to be taken if desired     - Nutrient Supplements list provided and copyto be taken if desired    - [employer]. Spoken[employer] web site offered to patient to review at their convenience by staff with login information    Note:  I have discussed with the patient that with all nutraceuticals, there is often mixed data and emerging research which needs to be monitored; as well as an array of NIH fact sheets on nutrients and supplements, available at www.nih,issue plus [employer]. SpokenLayer plus www.StemCyte,org. If I have recommended cinnamon at the request of this patient to assist them in control of their blood sugar, triglyceride, and/or weight issues. I discussed that the patient's clinical use of cinnamon bark, calcium, magnesium, Vitamin D, and pharmaceutical grade CVS omega 3 oil or triple-strength fish oil, and B-50/B-100 time-released B-complex [place] [id number] [facility] will be for a time-limited trial to determine their individual effectiveness and safety in this patient. I also referred the patient to the NMCD: Nutrition, Metabolism, and Cardiovascular Diseases (SecuritiesCard.pl) and concerns about long-term use and hepatotoxicity of cinnamon and other nutrients. I suggested they frequently search nih.gov for the latest non-proprietary information on nutriceuticals as well as consider a subscription to [employer] for details on reviewed supplements, or at the least review the nutrient files at [facility], [address] bark, an insulin mimetic, reduces some High Carbohydrate Dietary Impacts. Methylhydroxychalcone polymers insulin-enhancing properties in fat cells are responsible for enhanced glucose uptake, inhibiting hepatic HMG-CoA reductase and lowers lipids. www.jacn. org/content/20/4/327.full     But cinnamon with additives such as Cinnamon Extract are not effective as insulin mimetics.  :eStoreDirectory.at     Nutrients for Start up from [employer] or [employer]rooms for ease to get started now;  Rashid Rogel has some useable products;  - Triple Strength Fish Oil, enteric coated  - Vit D-3 5000 IU gel caps  - Iron ferrous sulfate 325 mg tabs  - Centrum Silver look-a-like for most patients, or  - Centrum plain look-a-like if need iron    Local pharmacies or chains such as [employer], have:  - Handpressions pharmaceutical grade omega 3 is 90% EPA/DHA whereas most Triple strength fish oil are 75% EPA/DHA  - Triple Strength Fish Oil (enteric coated if available) or if not enteric coated, can take from freezer for less burps  - B-50 or B-100 released balanced B complex tabs by [address] [facility] bark 500 mg (without Chromium or extracts)   some brands list 1000 mg / serving of 2 capsules,    some brands have 1000 mg caps with the undesireable chromium extract  - Calcium carbonate/citrate, magnesium oxide/citrate, Vit D-3 as 3-4 tabs/caps/serving     Some Local Brands may contain Zinc which is acceptable for the first bottle or two  - Magnesium oxide 250 mg tabs for those having < 2 bowel movements daily  - Magnesium citrate 200 mg if having > 2 bowel movements/day  - Centrum Silver or look-a-like for most patients, Centrum plain or look-a-like with iron  - Vitamin D-3 comes as 1,000 IU or 2,000 IU or 5,000 IU gel caps or Liquid drops but keep Vitamin D levels <50 but >40     Some brands containing or derived from soy oil or corn oil are OK if not allergic to soy  - Elemental Iron 65 mg tabs at bedtime is available over the counter if need more iron     Usually turns bowel movements grey, green, or black but not a concern  - Apricot Kernel Oil (by Now) for dry skin sensitive perineal or perianal area skin    Nutrients for ongoing use by Mail order for less expense from www. Liftopia ;  - Strength Fish Oil , 240 Softgels Item #938895  -B-100 time released balanced B complex Item #432420  - Cinnamon bark 500 mg without Chromium or extract Item #594677  - Calcium carbonate 1000 mg, Magnesium oxide 500 mg, Vit D-3 400 IU Item #430912  - Magnesium oxide 500 mg tabs Item #642077 if less than 2 bowel movements daily  - [employer] Seniors Item #814962 for most patients, One Daily Item #572291 with iron  - Vit D 3  1,000 Item #223186      2,000 IU Item #899688   Item #628474     Some brands containing orderived from soy oil or corn oil are OK if not allergic to soy    Nutrients for Special Needs by Mail order for less expense from www. puritan.com;  -Elemental Iron 65 mg tabs Item #971152 if need more iron for low iron on labs    Usually turns bowel movements grey, green or black but not a concern  - Time released Niacin 250 mg Item #320892 for cold intolerance, low libido or impotence  - DHEA 50 mg Item #647663 for improving DHEA levels on labs if having Fatigue    If stools too loose substitute for your Magnesium oxide using;   Magnesium citrate 200 mg tabs (NOT liquid) at [employer]   Magnesium gluconate 550 mg by Jomar Wahl at [employer] or K[employer]hu. com  Magnesium chloride foot soaks or body sprays  www.PureVideo Networks   Magnesium chloride flakes 14.99 Item #: [id number] if back-ordered, get spray  Magnesium threonate, Magtein also helps mental clarity and sleep    Food Drink Symptom Log;  I asked this patient to track these items and any other symptoms on their list on a weekly basis to documenttheir progress or lack of same.  This can be done on the symptom tracking sheet I gave them at today's visit but looks like this:                                                      Rate on scale of 0-10 with zero = not noticeable  Symptom:                            Week 1               2                 3                 4               Etc            Hair loss    Foot cramps    Paresthesia Aches    IBS (irritable bowel)    Constipation    Diarrhea  Nocturia (up to bathroom at night)    Fatigue/Energy level  Stress      On the other side of the sheet they can track their food, drink, environment, activity, symptoms etc      Avoiding Latex-like proteins in my foods; Avocados, Bananas, Celery, Figs & Kiwi proteins have latex-like proteins to inflame our immune systems, plus 47 more foods  How Can I Have A Latex Allergy? Eating foods with latex-like protein exposes us to latex allergies. Our body cannot tell the differencebetween these latex-like proteins and latex from rubber products since many people are allergic to fruit, vegetables and latex. Read labels on pre-packaged foods. This list to avoid is only a guide if you are known allergicto latex or have a latex rash on your chin, cheeks and lines on your neck and chest. The amount of latex is different in each food product or fruit variety. Avoid out of Season if not grown locally:   Melon, Nectarine, Papaya, Cherry, Passion fruit, Plum, Chestnuts, and Tomato. Avocado, Banana, Celery, Figs, and Kiwi always contain Latex-like protein. Whats in Season? Strawberries taste better in June than December because June is strawberry season so buy locally grown produce \"in season\" for the best flavor, cost, and less Latex. Locally grown produce not only tastes great but also requires little or no ethylene exposure in food distribution so has less latex content. Out of season: use canned, frozen, or dried since those are processed ripe and latex content is lower!!!     Month     [place] Locally Grown Produce  January, February, March: use canned, frozen or dried fruits since lower in latex  April: asparagus, radishes  May: asparagus, broccoli, green onions, greens, peas, radishes, rhubarb  June: asparagus, beets, beans, broccoli, cabbage, cantaloupe, carrots, green onions, greens, lettuce, onions, parsley, peas, radishes, rhubarb, strawberries, watermelons  July: beans, beets, blueberries, broccoli, cabbage, cantaloupe, carrots, cauliflower, celery, cucumbers, eggplant, grapes, green onions, greens, lettuce, onions, parsley, peas, peaches, bell peppers, potatoes, radishes, summer raspberries, squash, sweetcorn, tomatoes, turnips, watermelons  August: apples, beans, beets, blueberries, cabbage, cantaloupe, carrots, cauliflower, celery, cucumbers, eggplant, grapes, green onions, greens, lettuce, onions, parsley, peas, peaches, pears, bell peppers, potatoes, radishes, squash, sweet corn, tomatoes, turnips, watermelons  September: apples, beans, beets, blueberries, cabbage, cantaloupe, carrots, cauliflower, celery, cucumbers, eggplant, grapes, green onions, greens, lettuce, onions, parsley, peas, peaches, pears, bell peppers, plums, potatoes, pumpkins, radishes, fall red raspberries, squash, sweet corn, tomatoes, turnips, watermelons  October: apples, beets, broccoli, cabbage, carrots, cauliflower, celery, green onions, greens, lettuce, parsley, peas, pears, potatoes, pumpkins, radishes, fall red raspberries, squash, turnips  November: broccoli, cabbage, carrots, parsley, pears, peas  December: use canned, frozen or dried fruits since lower in latex    Upto half of latex-sensitive patients show allergic reactions to fruits (avocados, bananas, kiwifruits, papayas, peaches),   Annals of Allergy, 1994. These plants contain the same proteins that are allergens in latex. People with fruit allergies should warn physicians before undergoing procedures which may cause anaphylactic reaction if in contact with latex gloves. Some of the common foods with defined cross-reactivity to latex are avocado, banana, kiwi, chestnut, raw potato, tomato, stone fruits (e.g., peach, cherry), hazelnut, melons, celery, carrot, apple, pear, papaya, and almond.   Foods with less well-defined cross-reactivity to latex are peanuts, peppers, citrus fruits, coconut, pineapple, roberto, fig, passion fruit, Ugli fruit, and grape. This fruit/latex cross-reactivity is worsened by ethylene, a gas used to hasten commercial ripening. In nature, plants produce low levels of the hormone ethylene, which regulates germination, flowering, and ripening. Forced ripening by high ethylene concentrations, plants produce allergenic wound-repair proteins, which are similar to wound-repair proteins made during the tapping of rubber trees. Sensitive individuals who ingest the fruit get a higher dose and worse reaction. Some people may even first become sensitized to latex through fruit. Can food processing increase the concentrations of allergenic proteins? Latex-sensitized children (and adults) in [place] often experience allergic reactions after eating bananas ripened artificially with ethylene. In the [place], food distribution centers treat unripe bananas and other produce with ethylene to ripen; not commonly done in [facility] since fruit is tree-ripened there. Does treatment of food with ethylene induce banana proteins that cross-react with latex? (Chantal et al.)    References:   Latex in Foods Allergy, http://ehp.niehs.nih.gov/members/2003/5811/5811.html    Search web for [employer] in Season \" for where you live or are at the time you food shop   Management of Latex, ://medicalcenter. osu.edu/  search for nih, latex-like proteins in foods

## 2023-02-22 LAB — HOMOCYSTEINE: 8.8 UMOL/L

## 2023-02-24 LAB
NUCLEAR IGG SER QL IA: NORMAL
TTG IGA SER IA-ACNC: 0.5 U/ML

## 2023-02-25 LAB — THYROPEROXIDASE AB SERPL IA-ACNC: 7.9 IU/ML (ref 0–25)

## 2023-02-26 LAB — TTG IGG SER IA-ACNC: 1.5 U/ML

## 2023-05-02 ENCOUNTER — OFFICE VISIT (OUTPATIENT)
Dept: FAMILY MEDICINE CLINIC | Age: 58
End: 2023-05-02
Payer: COMMERCIAL

## 2023-05-02 VITALS
BODY MASS INDEX: 24.53 KG/M2 | TEMPERATURE: 97.2 F | DIASTOLIC BLOOD PRESSURE: 68 MMHG | OXYGEN SATURATION: 99 % | RESPIRATION RATE: 10 BRPM | HEART RATE: 79 BPM | HEIGHT: 71 IN | SYSTOLIC BLOOD PRESSURE: 114 MMHG | WEIGHT: 175.2 LBS

## 2023-05-02 DIAGNOSIS — R53.83 TIRED: ICD-10-CM

## 2023-05-02 DIAGNOSIS — R35.1 NOCTURIA: ICD-10-CM

## 2023-05-02 DIAGNOSIS — R05.3 CHRONIC COUGH: ICD-10-CM

## 2023-05-02 DIAGNOSIS — J45.20 MILD INTERMITTENT REACTIVE AIRWAY DISEASE WITHOUT COMPLICATION: Primary | ICD-10-CM

## 2023-05-02 DIAGNOSIS — K58.0 IRRITABLE BOWEL SYNDROME WITH DIARRHEA: ICD-10-CM

## 2023-05-02 DIAGNOSIS — Z71.89 ENCOUNTER FOR HERB AND VITAMIN SUPPLEMENT MANAGEMENT: ICD-10-CM

## 2023-05-02 DIAGNOSIS — R06.02 SHORTNESS OF BREATH: ICD-10-CM

## 2023-05-02 DIAGNOSIS — L65.9 HAIR LOSS: ICD-10-CM

## 2023-05-02 DIAGNOSIS — E78.5 ELEVATED LIPIDS: ICD-10-CM

## 2023-05-02 PROCEDURE — 99213 OFFICE O/P EST LOW 20 MIN: CPT | Performed by: FAMILY MEDICINE

## 2023-05-02 ASSESSMENT — ENCOUNTER SYMPTOMS: DIARRHEA: 1

## 2023-05-02 NOTE — PATIENT INSTRUCTIONS
Thank you   Thank you for trusting us with your healthcare needs. You may receive a survey regarding today's visit. It would help us out if you would take a few moments to provide your feedback. We value your input. Please bring in ALL medications BOTTLES, including inhalers, herbal supplements, over the counter, prescribed & non-prescribed medicine. The office would like actual medication bottles and a list.   Please note our OFFICE POLICIES:   Prior to getting your labs drawn, please check with your insurance company for benefits and eligibility of lab services. Often, insurance companies cover certain tests for preventative visits only. It is patient's responsibility to see what is covered. We are unable to change a diagnosis after the test has been performed. Lab orders will not be re-printed. Please hold onto your original lab orders and take them to your lab to be completed. If you no show your scheduled appointment three times, you will be dismissed from this practice. Reschedules must be completed 24 hours prior to your schedule appointment. If the list below has been completed, PLEASE FAX RECORDS TO OUR OFFICE @ [phone].  Once the records have been received we will update your records at our office:  Health Maintenance Due   Topic Date Due    Pneumococcal 0-64 years Vaccine (1 - PCV) Never done    HIV screen  Never done    Hepatitis C screen  Never done    DTaP/Tdap/Td vaccine (1 - Tdap) Never done    Colorectal Cancer Screen  Never done    Breast cancer screen  Never done    Shingles vaccine (1 of 2) Never done    COVID-19 Vaccine (3 - Booster for Moderna series) 03/24/2021

## 2023-05-02 NOTE — PROGRESS NOTES
rubber products since many people are allergic to fruit, vegetables and latex. Read labels on pre-packaged foods. This list to avoid is only a guide if you are known allergicto latex or have a latex rash on your chin, cheeks and lines on your neck and chest. The amount of latex is different in each food product or fruit variety. Avoid out of Season if not grown locally:   Melon, Nectarine, Papaya, Cherry, Passion fruit, Plum, Chestnuts, and Tomato. Avocado, Banana, Celery, Figs, and Kiwi always contain Latex-like protein. Whats in Season? Strawberries taste better in June than December because June is strawberry season so buy locally grown produce \"in season\" for the best flavor, cost, and less Latex. Locally grown produce not only tastes great but also requires little or no ethylene exposure in food distribution so has less latex content. Out of season: use canned, frozen, or dried since those are processed ripe and latex content is lower!!!     Month     [place] Locally Grown Produce  January, February, March: use canned, frozen or dried fruits since lower in latex  April: asparagus, radishes  May: asparagus, broccoli, green onions, greens, peas, radishes, rhubarb  Angelita: asparagus, beets, beans, broccoli, cabbage, cantaloupe, carrots, green onions, greens, lettuce, onions, parsley, peas, radishes, rhubarb, strawberries, watermelons  July: beans, beets, blueberries, broccoli, cabbage, cantaloupe, carrots, cauliflower, celery, cucumbers, eggplant, grapes, green onions, greens, lettuce, onions, parsley, peas, peaches, bell peppers, potatoes, radishes, summer raspberries, squash, sweetcorn, tomatoes, turnips, watermelons  August: apples, beans, beets, blueberries, cabbage, cantaloupe, carrots, cauliflower, celery, cucumbers, eggplant, grapes, green onions, greens, lettuce, onions, parsley, peas, peaches, pears, bell peppers, potatoes, radishes, squash, sweet corn, tomatoes, turnips, watermelons  September: apples,

## 2023-07-13 ENCOUNTER — OFFICE VISIT (OUTPATIENT)
Dept: FAMILY MEDICINE CLINIC | Age: 58
End: 2023-07-13
Payer: COMMERCIAL

## 2023-07-13 VITALS
TEMPERATURE: 97.6 F | DIASTOLIC BLOOD PRESSURE: 82 MMHG | WEIGHT: 181 LBS | HEIGHT: 71 IN | SYSTOLIC BLOOD PRESSURE: 136 MMHG | RESPIRATION RATE: 12 BRPM | BODY MASS INDEX: 25.34 KG/M2 | HEART RATE: 58 BPM

## 2023-07-13 DIAGNOSIS — Z00.00 WELL ADULT EXAM: Primary | ICD-10-CM

## 2023-07-13 DIAGNOSIS — Q24.5 MYOCARDIAL BRIDGE: ICD-10-CM

## 2023-07-13 DIAGNOSIS — Z12.31 ENCOUNTER FOR SCREENING MAMMOGRAM FOR MALIGNANT NEOPLASM OF BREAST: ICD-10-CM

## 2023-07-13 DIAGNOSIS — Z13.220 SCREENING CHOLESTEROL LEVEL: ICD-10-CM

## 2023-07-13 DIAGNOSIS — R74.8 ELEVATED LIVER ENZYMES: ICD-10-CM

## 2023-07-13 PROCEDURE — 99386 PREV VISIT NEW AGE 40-64: CPT | Performed by: NURSE PRACTITIONER

## 2023-07-13 ASSESSMENT — ENCOUNTER SYMPTOMS
ABDOMINAL DISTENTION: 0
SHORTNESS OF BREATH: 0
BACK PAIN: 0
WHEEZING: 0
CHEST TIGHTNESS: 0
COUGH: 0
ABDOMINAL PAIN: 0

## 2023-07-13 NOTE — PROGRESS NOTES
discussed the importance of up to date immunizations and annual screenings. Mammogram ordered CMP FLP ordered. Patient giveneducational materials - see patient instructions. Discussed use, benefit, and side effects of prescribed medications. All patient questions answered. Pt voiced understanding. Reviewed health maintenance. Patient agreedwith treatment plan. Follow up as directed.           Electronically signed by KAVON Estrella CNP on 7/13/2023 at 1:57 PM

## 2023-08-03 LAB
CHOLEST SERPL-MCNC: 163 MG/DL (ref 0–199)
FASTING: YES
GLUCOSE SERPL-MCNC: 92 MG/DL (ref 74–109)
HDLC SERPL-MCNC: 60 MG/DL (ref 40–90)
LDLC SERPL CALC-MCNC: 93 MG/DL
TRIGL SERPL-MCNC: 52 MG/DL (ref 0–199)

## 2023-09-08 ENCOUNTER — HOSPITAL ENCOUNTER (OUTPATIENT)
Age: 58
Discharge: HOME | End: 2023-09-08
Payer: COMMERCIAL

## 2023-09-08 DIAGNOSIS — Z00.00 WELL ADULT EXAM: ICD-10-CM

## 2023-09-08 DIAGNOSIS — R74.8 ELEVATED LIVER ENZYMES: ICD-10-CM

## 2023-09-08 DIAGNOSIS — R05.3 CHRONIC COUGH: ICD-10-CM

## 2023-09-08 DIAGNOSIS — R35.1 NOCTURIA: ICD-10-CM

## 2023-09-08 DIAGNOSIS — K58.0 IRRITABLE BOWEL SYNDROME WITH DIARRHEA: ICD-10-CM

## 2023-09-08 DIAGNOSIS — J45.20 MILD INTERMITTENT REACTIVE AIRWAY DISEASE WITHOUT COMPLICATION: ICD-10-CM

## 2023-09-08 DIAGNOSIS — Z71.89 ENCOUNTER FOR HERB AND VITAMIN SUPPLEMENT MANAGEMENT: ICD-10-CM

## 2023-09-08 DIAGNOSIS — R06.02 SHORTNESS OF BREATH: ICD-10-CM

## 2023-09-08 DIAGNOSIS — R53.83 TIRED: ICD-10-CM

## 2023-09-08 DIAGNOSIS — E78.5 ELEVATED LIPIDS: ICD-10-CM

## 2023-09-08 DIAGNOSIS — L65.9 HAIR LOSS: ICD-10-CM

## 2023-09-08 LAB
25(OH)D3 SERPL-MCNC: 34 NG/ML (ref 30–100)
ALBUMIN SERPL BCG-MCNC: 4.3 G/DL (ref 3.5–5.1)
ALP SERPL-CCNC: 103 U/L (ref 38–126)
ALT SERPL W/O P-5'-P-CCNC: 19 U/L (ref 11–66)
ANION GAP SERPL CALC-SCNC: 14 MEQ/L (ref 8–16)
AST SERPL-CCNC: 20 U/L (ref 5–40)
BASOPHILS ABSOLUTE: 0 THOU/MM3 (ref 0–0.1)
BASOPHILS NFR BLD AUTO: 1 %
BILIRUB SERPL-MCNC: 0.6 MG/DL (ref 0.3–1.2)
BUN SERPL-MCNC: 16 MG/DL (ref 7–22)
C-REACTIVE PROTEIN, HIGH SENSITIVITY: 0.8 MG/L
CALCIUM SERPL-MCNC: 9.4 MG/DL (ref 8.5–10.5)
CHLORIDE SERPL-SCNC: 107 MEQ/L (ref 98–111)
CHOLEST SERPL-MCNC: 170 MG/DL (ref 100–199)
CO2 SERPL-SCNC: 25 MEQ/L (ref 23–33)
CREAT SERPL-MCNC: 0.6 MG/DL (ref 0.4–1.2)
DEPRECATED RDW RBC AUTO: 45.1 FL (ref 35–45)
EOSINOPHIL NFR BLD AUTO: 6.4 %
EOSINOPHILS ABSOLUTE: 0.2 THOU/MM3 (ref 0–0.4)
ERYTHROCYTE [DISTWIDTH] IN BLOOD BY AUTOMATED COUNT: 13 % (ref 11.5–14.5)
ERYTHROCYTE [SEDIMENTATION RATE] IN BLOOD BY WESTERGREN METHOD: 2 MM/HR (ref 0–20)
GFR SERPL CREATININE-BSD FRML MDRD: > 60 ML/MIN/1.73M2
GLUCOSE SERPL-MCNC: 99 MG/DL (ref 70–108)
HCT VFR BLD AUTO: 42.3 % (ref 37–47)
HDLC SERPL-MCNC: 51 MG/DL
HGB BLD-MCNC: 13.4 GM/DL (ref 12–16)
IMM GRANULOCYTES # BLD AUTO: 0.01 THOU/MM3 (ref 0–0.07)
IMM GRANULOCYTES NFR BLD AUTO: 0.3 %
IRON SERPL-MCNC: 129 UG/DL (ref 50–170)
LDLC SERPL CALC-MCNC: 100 MG/DL
LYMPHOCYTES ABSOLUTE: 2 THOU/MM3 (ref 1–4.8)
LYMPHOCYTES NFR BLD AUTO: 50.8 %
MAGNESIUM SERPL-MCNC: 2.1 MG/DL (ref 1.6–2.4)
MCH RBC QN AUTO: 30.1 PG (ref 26–33)
MCHC RBC AUTO-ENTMCNC: 31.7 GM/DL (ref 32.2–35.5)
MCV RBC AUTO: 95.1 FL (ref 81–99)
MONOCYTES ABSOLUTE: 0.2 THOU/MM3 (ref 0.4–1.3)
MONOCYTES NFR BLD AUTO: 5.6 %
NEUTROPHILS NFR BLD AUTO: 35.9 %
NRBC BLD AUTO-RTO: 0 /100 WBC
PLATELET # BLD AUTO: 284 THOU/MM3 (ref 130–400)
PMV BLD AUTO: 11.5 FL (ref 9.4–12.4)
POTASSIUM SERPL-SCNC: 3.7 MEQ/L (ref 3.5–5.2)
PROT SERPL-MCNC: 6.9 G/DL (ref 6.1–8)
PTH-INTACT SERPL-MCNC: 34.5 PG/ML (ref 15–65)
RBC # BLD AUTO: 4.45 MILL/MM3 (ref 4.2–5.4)
SEGMENTED NEUTROPHILS ABSOLUTE COUNT: 1.4 THOU/MM3 (ref 1.8–7.7)
SODIUM SERPL-SCNC: 146 MEQ/L (ref 135–145)
TIBC SERPL-MCNC: 286 UG/DL (ref 171–450)
TRIGL SERPL-MCNC: 97 MG/DL (ref 0–199)
WBC # BLD AUTO: 3.9 THOU/MM3 (ref 4.8–10.8)

## 2023-09-08 PROCEDURE — 85651 RBC SED RATE NONAUTOMATED: CPT

## 2023-09-08 PROCEDURE — 83516 IMMUNOASSAY NONANTIBODY: CPT

## 2023-09-08 PROCEDURE — 83550 IRON BINDING TEST: CPT

## 2023-09-08 PROCEDURE — 83970 ASSAY OF PARATHORMONE: CPT

## 2023-09-08 PROCEDURE — 83735 ASSAY OF MAGNESIUM: CPT

## 2023-09-08 PROCEDURE — 82306 VITAMIN D 25 HYDROXY: CPT

## 2023-09-08 PROCEDURE — 85025 COMPLETE CBC W/AUTO DIFF WBC: CPT

## 2023-09-08 PROCEDURE — 86141 C-REACTIVE PROTEIN HS: CPT

## 2023-09-08 PROCEDURE — 80053 COMPREHEN METABOLIC PANEL: CPT

## 2023-09-08 PROCEDURE — 80061 LIPID PANEL: CPT

## 2023-09-08 PROCEDURE — 36415 COLL VENOUS BLD VENIPUNCTURE: CPT

## 2023-09-08 PROCEDURE — 86376 MICROSOMAL ANTIBODY EACH: CPT

## 2023-09-08 PROCEDURE — 83540 ASSAY OF IRON: CPT

## 2023-09-12 LAB — THYROPEROXIDASE AB SERPL IA-ACNC: < 4 IU/ML (ref 0–25)

## 2023-09-13 LAB
GLIADIN IGG SER IA-ACNC: < 0.4 U/ML
TTG IGA SER IA-ACNC: 0.4 U/ML

## 2023-12-26 ENCOUNTER — TELEPHONE (OUTPATIENT)
Dept: FAMILY MEDICINE CLINIC | Age: 58
End: 2023-12-26

## 2023-12-26 RX ORDER — SULFAMETHOXAZOLE AND TRIMETHOPRIM 800; 160 MG/1; MG/1
1 TABLET ORAL 2 TIMES DAILY
Qty: 20 TABLET | Refills: 0 | Status: SHIPPED | OUTPATIENT
Start: 2023-12-26 | End: 2024-01-05

## 2023-12-26 NOTE — TELEPHONE ENCOUNTER
Patient called stating she started having Urinary frequency, burning on Saturday so yesterday she started taking left over bactrim she had yesterday and has some to take for today. She is asking for a prescription for it without being seen because she has to work until late. Please advise.      [facility] op pharmy

## 2024-10-17 ENCOUNTER — OFFICE VISIT (OUTPATIENT)
Dept: PULMONOLOGY | Age: 59
End: 2024-10-17

## 2024-10-17 ENCOUNTER — LAB (OUTPATIENT)
Dept: LAB | Age: 59
End: 2024-10-17

## 2024-10-17 VITALS
BODY MASS INDEX: 24.47 KG/M2 | SYSTOLIC BLOOD PRESSURE: 104 MMHG | OXYGEN SATURATION: 96 % | HEIGHT: 71 IN | TEMPERATURE: 98.1 F | WEIGHT: 174.8 LBS | DIASTOLIC BLOOD PRESSURE: 68 MMHG | HEART RATE: 70 BPM

## 2024-10-17 DIAGNOSIS — R06.2 WHEEZING: ICD-10-CM

## 2024-10-17 DIAGNOSIS — J45.902 REACTIVE AIRWAY DISEASE WITH STATUS ASTHMATICUS, UNSPECIFIED ASTHMA SEVERITY, UNSPECIFIED WHETHER PERSISTENT: Primary | ICD-10-CM

## 2024-10-17 DIAGNOSIS — J45.902 REACTIVE AIRWAY DISEASE WITH STATUS ASTHMATICUS, UNSPECIFIED ASTHMA SEVERITY, UNSPECIFIED WHETHER PERSISTENT: ICD-10-CM

## 2024-10-17 DIAGNOSIS — J32.4 CHRONIC PANSINUSITIS: ICD-10-CM

## 2024-10-17 DIAGNOSIS — R06.02 SOB (SHORTNESS OF BREATH): ICD-10-CM

## 2024-10-17 DIAGNOSIS — R94.2 RESTRICTIVE PATTERN PRESENT ON PULMONARY FUNCTION TESTING: ICD-10-CM

## 2024-10-17 LAB
BASOPHILS ABSOLUTE: 0 THOU/MM3 (ref 0–0.1)
BASOPHILS NFR BLD AUTO: 0.8 %
DEPRECATED RDW RBC AUTO: 42.1 FL (ref 35–45)
EOSINOPHIL NFR BLD AUTO: 4.3 %
EOSINOPHILS ABSOLUTE: 0.2 THOU/MM3 (ref 0–0.4)
ERYTHROCYTE [DISTWIDTH] IN BLOOD BY AUTOMATED COUNT: 12.5 % (ref 11.5–14.5)
HCT VFR BLD AUTO: 43.6 % (ref 37–47)
HGB BLD-MCNC: 14.1 GM/DL (ref 12–16)
IMM GRANULOCYTES # BLD AUTO: 0.01 THOU/MM3 (ref 0–0.07)
IMM GRANULOCYTES NFR BLD AUTO: 0.2 %
LYMPHOCYTES ABSOLUTE: 2.1 THOU/MM3 (ref 1–4.8)
LYMPHOCYTES NFR BLD AUTO: 40 %
MCH RBC QN AUTO: 29.9 PG (ref 26–33)
MCHC RBC AUTO-ENTMCNC: 32.3 GM/DL (ref 32.2–35.5)
MCV RBC AUTO: 92.4 FL (ref 81–99)
MONOCYTES ABSOLUTE: 0.3 THOU/MM3 (ref 0.4–1.3)
MONOCYTES NFR BLD AUTO: 6.2 %
NEUTROPHILS ABSOLUTE: 2.5 THOU/MM3 (ref 1.8–7.7)
NEUTROPHILS NFR BLD AUTO: 48.5 %
NRBC BLD AUTO-RTO: 0 /100 WBC
PLATELET # BLD AUTO: 297 THOU/MM3 (ref 130–400)
PMV BLD AUTO: 10.8 FL (ref 9.4–12.4)
RBC # BLD AUTO: 4.72 MILL/MM3 (ref 4.2–5.4)
WBC # BLD AUTO: 5.2 THOU/MM3 (ref 4.8–10.8)

## 2024-10-17 RX ORDER — ALBUTEROL SULFATE 90 UG/1
2 INHALANT RESPIRATORY (INHALATION) EVERY 4 HOURS PRN
Qty: 54 G | Refills: 3 | Status: SHIPPED | OUTPATIENT
Start: 2024-10-17

## 2024-10-17 RX ORDER — MONTELUKAST SODIUM 10 MG/1
10 TABLET ORAL NIGHTLY
Qty: 90 TABLET | Refills: 3 | Status: SHIPPED | OUTPATIENT
Start: 2024-10-17

## 2024-10-17 ASSESSMENT — ENCOUNTER SYMPTOMS
COUGH: 1
EYES NEGATIVE: 1
RHINORRHEA: 1
SHORTNESS OF BREATH: 1
ALLERGIC/IMMUNOLOGIC NEGATIVE: 1
WHEEZING: 1
GASTROINTESTINAL NEGATIVE: 1
SINUS PRESSURE: 1

## 2024-10-17 NOTE — PROGRESS NOTES
[facility]    Patient: SORAYA LOPEZ, 59 y.o.   : 1965  10/17/2024      Subjective     Chief Complaint   Patient presents with    Follow-up     Pulm f/u last seen 23, no testing.         HPI  Soraya is here for follow up for RAD/Asthma.    Increased SOB and wheezing lately   Increased cough- sometimes productive- clear, tan- NO hemoptysis   No fever or chills   Weight stable   Never a smoker   Previous use of Breo currently just with [employer]  Breathing issues worse lately with harvesting of crops   Repeat NIOX testing today- last NIOX in our office 150 ppb  Following with ENT for chronic pansinusitis     Asthma control (Severity) questionnaire:  Asthma symptoms:  > 2 times per week -> Yes   Night time awakenings: > 2 times per month -> No  Use of short acting beta agonist for symptoms control (Other than pre exercise use) :> 2times per week  -> Yes  Interference with normal activity  -> moderate  Lung function: Fev1 >60% Predicted  -> Yes  Number of exacerbations per year: > 2 -> No    Progress History:   Since last visit any new medical issues? No  New ER or hospital visits? No  Any new or changes in medicines? No  Using inhalers? No  Are they helpful? NA    Immunization History   Administered Date(s) Administered    COVID-19, MODERNA BLUE border, Primary or Immunocompromised, (age 12y+), IM, 100 mcg/0.5mL 2020, 2021    Influenza, AFLURIA, FLUZONE, (age4 y+), IM, Trivalent MDV, 0.5mL 2021, 2022    Influenza, FLUCELVAX, (age 6 mo+), MDCK, Quadv PF, 0.5mL 2019, 2020       Tobacco Use      Smoking status: Never      Smokeless tobacco: Never       Past Medical hx   PMH:  Past Medical History:   Diagnosis Date    Asthma 20    Nit sure i have it it was based off of poct niox test    Frequent PVCs     Irritable bowel syndrome 20    Aftwr i had cdiff     SURGICAL HISTORY:  Past Surgical History:   Procedure Laterality Date    OTHER

## 2024-10-18 LAB
ALLERGEN BERMUDA GRASS IGE: < 0.1 KU/L (ref 0–0.34)
ALLERGEN BIRCH IGE: < 0.1 KU/L (ref 0–0.34)
ALLERGEN DOG DANDER IGE: < 0.1 KU/L (ref 0–0.34)
ALLERGEN GERMAN COCKROACH IGE: < 0.1 KU/L (ref 0–0.34)
ALLERGEN HORMODENDRUM IGE: < 0.1 KUL/L (ref 0–0.34)
ALLERGEN MOUSE EPITHELIA IGE: < 0.1 KU/L (ref 0–0.34)
ALLERGEN OAK TREE IGE: < 0.1 KU/L (ref 0–0.34)
ALLERGEN PECAN TREE IGE: < 0.1 KU/L (ref 0–0.34)
ALLERGEN PIGWEED ROUGH IGE: < 0.1 KU/L (ref 0–0.34)
ALLERGEN SHEEP SORREL (W18) IGE: < 0.1 KU/L (ref 0–0.34)
ALLERGEN TREE SYCAMORE: < 0.1 KU/L (ref 0–0.34)
ALLERGEN WALNUT TREE IGE: < 0.1 KU/L (ref 0–0.34)
ALLERGEN WHITE MULBERRY TREE, IGE: < 0.1 KU/L (ref 0–0.34)
ALLERGEN, TREE, WHITE ASH IGE: < 0.1 KU/L (ref 0–0.34)
ALTERNARIA ALTERNATA: < 0.1 KU/L (ref 0–0.34)
ASPERGILLUS FUMIGATUS: < 0.1 KU/L (ref 0–0.34)
CAT DANDER ANTIBODY: < 0.1 KU/L (ref 0–0.34)
COTTONWOOD TREE: < 0.1 KU/L (ref 0–0.34)
D. FARINAE: < 0.1 KU/L (ref 0–0.34)
D. PTERONYSSINUS: < 0.1 KU/L (ref 0–0.34)
ELM TREE: < 0.1 KU/L (ref 0–0.34)
IGE SERPL-ACNC: 41 IU/ML (ref 0–100)
MAPLE/BOXELDER TREE: < 0.1 KU/L (ref 0–0.34)
MOUNTAIN CEDAR TREE: < 0.1 KU/L (ref 0–0.34)
MUCOR RACEMOSUS: < 0.1 KU/L (ref 0–0.34)
P. NOTATUM: < 0.1 KU/L (ref 0–0.34)
RUSSIAN THISTLE: < 0.1 KU/L (ref 0–0.34)
SHORT RAGWD(A ARTEMIS.) IGE: < 0.1 KU/L (ref 0–0.34)
TIMOTHY GRASS: < 0.1 KU/L (ref 0–0.34)

## 2024-10-21 DIAGNOSIS — J45.40 MODERATE PERSISTENT ASTHMA WITHOUT COMPLICATION: Primary | ICD-10-CM

## 2025-01-17 ENCOUNTER — TELEPHONE (OUTPATIENT)
Dept: PULMONOLOGY | Age: 60
End: 2025-01-17

## 2025-04-01 LAB — FASTING: YES

## 2025-05-07 LAB
CHOLEST SERPL-MCNC: 169 MG/DL (ref 0–199)
FASTING: YES
GLUCOSE SERPL-MCNC: 86 MG/DL (ref 74–109)
HDLC SERPL-MCNC: 44 MG/DL (ref 40–90)
LDLC SERPL CALC-MCNC: 107 MG/DL
TRIGL SERPL-MCNC: 90 MG/DL (ref 0–199)

## 2025-07-24 NOTE — PROGRESS NOTES
[facility]    Patient: SORAYA LOPEZ, 60 y.o.   : 1965      Patient of mine    Assessment/Plan      Diagnosis Orders   1. Moderate persistent asthma without complication  beclomethasone (QVAR REDIHALER) 80 MCG/ACT AERB inhaler    Bronchial Challenge           -Continue Qvar as ordered during spring and fall when triggers are most severe  -Continue albuterol as ordered  -Complete methacholine challenge to further evaluate for asthma as no post-bronchodilator testing has ever been completed on patient  -Will call patient with results when available  -Encouraged to stay up to date on any indicated vaccinations I.e. influenza, pneumonia, RSV, and covid-19     Advised patient to call office with any changes, questions, or concerns regarding respiratory status or issues with prescribed medications    Return in about 1 year (around 2026) for asthma.     Subjective     Chief Complaint   Patient presents with    Follow-up     Asthma and reactive airway follow up. Last seen 10/2024 by Elle PUTNAM  Soraya is here for follow up for reactive airway disease/asthma.   History of elevated Feno levels.  Previously negative allergy testing and IgE level.  No history of significantly elevated absolute eosinophil count.    Overall patient reports respiratory symptoms have been stable since last appointment.  She denies any respiratory symptoms over the past several months.  Her triggers are usually most severe around springtime. She had to use Qvar during that time. She also notices symptoms during harvest season. Reports that her  farms and [place] farm. She uses Qvar during spring and fall when she is noticing more coughing, shortness of breath, wheezing. She has not used Qvar in the past month. Patient using albuterol 1 times per week on average. Patient reports no physical limitation due to respiratory symptoms.     Asthma control (severity)

## 2025-07-31 ENCOUNTER — OFFICE VISIT (OUTPATIENT)
Dept: PULMONOLOGY | Age: 60
End: 2025-07-31
Payer: COMMERCIAL

## 2025-07-31 VITALS
WEIGHT: 172.8 LBS | SYSTOLIC BLOOD PRESSURE: 110 MMHG | HEART RATE: 70 BPM | OXYGEN SATURATION: 98 % | TEMPERATURE: 97.8 F | BODY MASS INDEX: 24.19 KG/M2 | DIASTOLIC BLOOD PRESSURE: 72 MMHG | RESPIRATION RATE: 16 BRPM | HEIGHT: 71 IN

## 2025-07-31 DIAGNOSIS — J45.40 MODERATE PERSISTENT ASTHMA WITHOUT COMPLICATION: Primary | ICD-10-CM

## 2025-07-31 PROCEDURE — 99214 OFFICE O/P EST MOD 30 MIN: CPT

## 2025-07-31 ASSESSMENT — ENCOUNTER SYMPTOMS
SHORTNESS OF BREATH: 0
SINUS PRESSURE: 0
COUGH: 0
CHEST TIGHTNESS: 0
SINUS PAIN: 0
WHEEZING: 0
RHINORRHEA: 0

## 2025-09-02 ENCOUNTER — HOSPITAL ENCOUNTER (OUTPATIENT)
Dept: PULMONOLOGY | Age: 60
Discharge: HOME | End: 2025-09-02
Payer: COMMERCIAL

## 2025-09-02 DIAGNOSIS — J45.40 MODERATE PERSISTENT ASTHMA WITHOUT COMPLICATION: ICD-10-CM

## 2025-09-02 PROCEDURE — 94070 EVALUATION OF WHEEZING: CPT

## 2025-09-02 PROCEDURE — 6360000002 HC RX W HCPCS
